# Patient Record
Sex: FEMALE | NOT HISPANIC OR LATINO | Employment: OTHER | URBAN - METROPOLITAN AREA
[De-identification: names, ages, dates, MRNs, and addresses within clinical notes are randomized per-mention and may not be internally consistent; named-entity substitution may affect disease eponyms.]

---

## 2017-01-23 ENCOUNTER — OFFICE VISIT (OUTPATIENT)
Dept: ENDOCRINOLOGY | Facility: CLINIC | Age: 67
End: 2017-01-23

## 2017-01-23 VITALS
WEIGHT: 251 LBS | SYSTOLIC BLOOD PRESSURE: 151 MMHG | DIASTOLIC BLOOD PRESSURE: 96 MMHG | OXYGEN SATURATION: 97 % | BODY MASS INDEX: 39.39 KG/M2 | HEART RATE: 90 BPM | HEIGHT: 67 IN

## 2017-01-23 DIAGNOSIS — E66.01 MORBID OBESITY DUE TO EXCESS CALORIES (H): Primary | ICD-10-CM

## 2017-01-23 ASSESSMENT — ENCOUNTER SYMPTOMS
HEARTBURN: 1
COUGH: 1
VOMITING: 0
RECTAL PAIN: 0
SHORTNESS OF BREATH: 0
BLOATING: 0
DYSPNEA ON EXERTION: 0
SLEEP DISTURBANCES DUE TO BREATHING: 0
SPUTUM PRODUCTION: 0
LEG SWELLING: 1
LOSS OF CONSCIOUSNESS: 0
MUSCLE CRAMPS: 1
ORTHOPNEA: 0
BACK PAIN: 1
STIFFNESS: 0
HYPERTENSION: 0
HOARSE VOICE: 0
BLOOD IN STOOL: 0
TREMORS: 0
WHEEZING: 0
MYALGIAS: 0
COUGH DISTURBING SLEEP: 0
POSTURAL DYSPNEA: 0
SYNCOPE: 0
MUSCLE WEAKNESS: 0
PARALYSIS: 0
CONSTIPATION: 0
JAUNDICE: 0
NECK MASS: 0
TACHYCARDIA: 0
TROUBLE SWALLOWING: 0
EXERCISE INTOLERANCE: 0
ARTHRALGIAS: 0
SEIZURES: 0
TASTE DISTURBANCE: 0
ABDOMINAL PAIN: 0
DISTURBANCES IN COORDINATION: 0
TINGLING: 0
SORE THROAT: 1
RESPIRATORY PAIN: 0
HYPOTENSION: 0
SINUS PAIN: 0
HEMOPTYSIS: 0
DIARRHEA: 0
SINUS CONGESTION: 1
RECTAL BLEEDING: 0
SPEECH CHANGE: 0
NECK PAIN: 0
PALPITATIONS: 0
LEG PAIN: 0
NUMBNESS: 0
SMELL DISTURBANCE: 0
SNORES LOUDLY: 1
BOWEL INCONTINENCE: 0
DIZZINESS: 1
LIGHT-HEADEDNESS: 0
MEMORY LOSS: 1
CLAUDICATION: 0
JOINT SWELLING: 0
HEADACHES: 1
NAUSEA: 0

## 2017-01-23 NOTE — NURSING NOTE
"Chief Complaint   Patient presents with     Weight Problem     [facility]       Filed Vitals:    01/23/17 1102   BP: 151/96   Pulse: 90   Height: 5' 7\"   Weight: 251 lb   SpO2: 97%       Body mass index is 39.3 kg/(m^2).  Emerita Garner CMA                         "

## 2017-01-23 NOTE — PROGRESS NOTES
"    New Medical Weight Management Consult    PATIENT:  Ariadna Duran  MRN:         [id number]  :         1950  BRIANDA:         2017    Dear Dr. Barros,     I had the pleasure of seeing your patient, Ariadna Duran.  Full intake/assessment done to determine barriers to weight loss success and develop a treatment plan.  Ariadna Duran is a 66 year old female interested in treatment of medical problems associated with weight.  Her weight today is 251 lbs 0 oz, Body mass index is 39.3 kg/(m^2)., and she has the following co-morbidities:  I Have Reviewed The Following Co-Morbidities With The Patient 2017   I have the following co-morbidities associated with obesity: Hypertension (High Blood Pressure), Metabolic Syndrome, Back Pain   I have the following co-morbidities associated with obesity: Back Pain       Patient Goals Reviewed With Patient 2017   I am interested in attaining a healthier weight to diminish current health problems related to co-morbid conditions: Yes   I am interested in attaining a healthier weight in order to prevent future health problems: Yes       Referring Provider 2017   Please name the provider who referred you to Medical Weight Management.  If you do not know, please answer: \"I Don't Know\". Dr Barros, DO       Wt Readings from Last 4 Encounters:   17 113.853 kg (251 lb)   16 112.447 kg (247 lb 14.4 oz)   16 109.317 kg (241 lb)   16 105.96 kg (233 lb 9.6 oz)       Weight History Reviewed With Patient 2017   How concerned are you about your weight? Very Concerned   Would you describe your weight gain as gradual? Yes   I became overweight: As a Child   The following factors have contributed to my weight gain:  Lack of Exercise   I have tried the following methods to lose weight: Watching Portions or Calories, Weight Watchers, Atkins-type Diet (Low Carb/High Protein), Slim Fast or Other Liquid Diets, Medications, Weight Loss Surgery   The " most weight I have ever lost was: (lbs) -   My lowest weight since age 19 was: 153   My highest weight since age 19 was: 270   I have the following family history of obesity/being overweight:  -   Has anyone in your family had weight loss surgery? No       Diet Recall Reviewed With Patient 1/23/2017   How many glasses of juice do you drink in a typical day? 0   How many of glasses of milk do you drink in a typical day? 0   How many 8oz glasses of sugar containing drinks such as Tacos-Aid/sweet tea do you drink in a day? 0   How many cans/bottles of sugar pop/soda/tea/sports drinks do you drink in a day? 0   How many cans/bottles of diet pop/soda/tea or sports drink do you drink in a day? 0   How often do you have a drink of alcohol? Monthly or Less   If you do drink, how many drinks might you have in a day? 1 or 2       Eating Habits Reviewed With Patient 1/23/2017   Generally, my meals include foods like these: bread, pasta, rice, potatoes, corn, crackers, sweet dessert, pop, or juice. Almost Everyday   Generally, my meals include foods like these: fried meats, brats, burgers, french fries, pizza, cheese, chips, or ice cream. Half of the Week   Eat fast food (like [employer], [employer], Taco Bell). A Few Times a Week   Eat at a buffet or sit-down restaurant. A Few Times a Week   Eat most of my meals in front of the TV or computer. Everyday   Often skip meals, eat at random times, have no regular eating times. A Few Times a Week   Rarely sit down for a meal but snack or graze throughout.  A Few Times a Week   Eat extra snacks between meals. A Few Times a Week   Eat most of my food at the end of the day. A Few Times a Week   Eat in the middle of the night or wake up at night to eat. Never   Eat extra snacks to prevent or correct low blood sugar. Never   Eat to prevent acid reflux or stomach pain. Half of the Week   Worry about not having enough food to eat. Never   Have you been to the food shelf at least a few times  this year? No   I eat when I am depressed, stressed, anxious, or bored. Half of the Week   I eat when I am happy or as a reward. A Few Times a Week   I feel hungry all the time even if I just have eaten. Never   Feeling full is important to me. Never   Once I start eating, it is hard to stop. -   I finish all the food on my plate even if I am already full. A Few Times a Week   I can't resist eating delicious food or walk past the good food/smell. Never   I eat/snack without noticing that I am eating. A Few Times a Week   I eat when I am preparing the meal. Everyday   I eat more than usual when I see others eating. Half of the Week   I have trouble not eating sweets, ice cream, cookies, or chips if they are around the house. A Few Times a Week   I think about food all day. Almost Everyday   What foods, if any, do you crave? -   Please list any other foods you crave? -   I feel out of control when eating. Almost Everyday   I eat a large amount of food, like a loaf of bread, a box of cookies, a pint/quart of ice cream, all at once. Never   I eat a large amount of food even when I am not hungry. Monthly   I eat rapidly. Weekly   I eat alone because I feel embarrassed and do not want others to see how much I have eaten. Never   I eat until I am uncomfortably full. Weekly   I feel bad, disgusted, or guilty after I overeat. Weekly   I make myself vomit what I have eaten or use laxatives to get rid of food. Never       Activity/Exercise History Reviewed With Patient 1/23/2017   How much of a typical 12 hour day do you spend sitting? Half the Day   How much of a typical 12 hour day do you spend lying down? Less Than Half the Day   How much of a typical day do you spend walking/standing? Half the Day   How many hours (not including work) do you spend on the TV/Video Games/Computer/Tablet/Phone? 6 Hours or More   How many times a week are you active for the purpose of exercise? Never   How many total minutes do you spend doing  some activity for the purpose of exercising when you exercise? 15-30 Minutes   What keeps you from being more active? Other       ROS    PAST MEDICAL HISTORY:  Past Medical History   Diagnosis Date     Hypertension goal BP (blood pressure) < 140/90      Overweight (BMI 25.0-29.9)      Intestinal malabsorption      GERD (gastroesophageal reflux disease) 6/17/2012     Dvt femoral (deep venous thrombosis) (H) 2010     x 2 (one in each leg) years apart      Thyroid nodule      followed by endocrine     Factor 5 Leiden mutation, heterozygous (H)        Work/Social History Reviewed With Patient 1/23/2017   My employment status is: Retired   What is your marital status? /In a Relationship   If in a relationship, is your significant other overweight? Yes   Do you have children? Yes   If you have children, are they overweight? Yes       Mental Health History Reviewed With Patient 1/23/2017   Have you ever been physically or sexually abused? No   How often in the past 2 weeks have you felt little interest or pleasure in doing things? For Several Days   Over the past 2 weeks how often have you felt down, depressed, or hopeless? For Several Days       Sleep History Reviewed With Patient 1/23/2017   How many hours do you sleep at night? 6   Do you think that you snore loudly or has anybody ever heard you snore loudly (louder than talking or so loud it can be heard behind a shut door)? Yes   Has anyone seen or heard you stop breathing during your sleep? No   Do you often feel tired, fatigued, or sleepy during the day? Yes       MEDICATIONS:   Current Outpatient Prescriptions   Medication Sig Dispense Refill     lisinopril (PRINIVIL,ZESTRIL) 10 MG tablet Take 1 tablet (10 mg) by mouth daily 90 tablet 0     triamcinolone (KENALOG) 0.1 % lotion Apply sparingly to affected area three times daily as needed. 60 mL 1     Zinc-Vitamin C  MG LOZG Take 1 tablet by mouth daily       valACYclovir (VALTREX) 1000 mg tablet Take 2  "tablets (2,000 mg) by mouth 2 times daily 12 tablet 0     LYSINE PO        rivaroxaban ANTICOAGULANT (XARELTO) 20 MG TABS tablet Take 1 tablet (20 mg) by mouth daily (with dinner) 90 tablet 1     acyclovir (ZOVIRAX) 5 % cream Apply topically 5 times daily 5 g 3     cyanocolbalamin (VITAMIN  B-12) 1000 MCG tablet Take  by mouth.           ALLERGIES:   Allergies   Allergen Reactions     Nkda [No Known Drug Allergies]        PHYSICAL EXAM:  /96 mmHg  Pulse 90  Ht 1.702 m (5' 7\")  Wt 113.853 kg (251 lb)  BMI 39.30 kg/m2  SpO2 97%  LMP  (LMP Unknown)   A & O x 3  HEENT: NCAT, mucous membranes moist  Respirations unlabored  Location of obesity: Mixed Obesity    ASSESSMENT:  Ariadna is a patient with mature onset morbid obesity and post bariatric weight gain with significant element of familial/genetic influence and with current health consequences. She does need aggressive weight loss plan due to metabolic syndrome.      Ariadna Crunkilton endorses binging, eats a high carb diet, eats a high fat diet, eats fast food once or more per week, uses food as mood management, tends to snack/graze throughout day, rarely sitting to eat a true meal and has a disorganized meal pattern.    Her problem is complicated by strong craving/reward pathways, a binge eating component and poor lifestyle choices    Her ability to lose weight is impacted by lack of confidence.    PLAN:    Dietician visit of education  Volumetrics eating plan  Meal planning  - focus on no between meal snacking, aggressive lowering of starches and cheese    Craving/Reward   Ancillary testing:  N/A.  Food Plan:  Volumetrics and High protein/low carbohydrate.   Activity Plan:  Activity journal.  Supplementary:  N/A.   Medication:  The patient will begin medication in pursuit of improved medical status as influenced by body weight. She will start Qsymia.  There is a mutual understanding of the goals and risks of this therapy. The patient is in agreement. She is " educated on dosage regimen and possible side effects.    RTC:    12 weeks.  30/45 minutes spent on counseling and education    Sincerely,    Yasir Irizarry MD

## 2017-01-23 NOTE — Clinical Note
"2017       RE: Ariadna Duran  [address]  [facility] [address]     Dear Colleague,    Thank you for referring your patient, Ariadna Duran, to the [facility] MEDICAL WEIGHT MANAGEMENT at [facility]. Please see a copy of my visit note below.        New Medical Weight Management Consult    PATIENT:  Ariadna Duran  MRN:         [id number]  :         1950  BRIANDA:         2017    Dear Dr. Barros,     I had the pleasure of seeing your patient, Ariadna Duran.  Full intake/assessment done to determine barriers to weight loss success and develop a treatment plan.  Ariadna Duran is a 66 year old female interested in treatment of medical problems associated with weight.  Her weight today is 251 lbs 0 oz, Body mass index is 39.3 kg/(m^2)., and she has the following co-morbidities:  I Have Reviewed The Following Co-Morbidities With The Patient 2017   I have the following co-morbidities associated with obesity: Hypertension (High Blood Pressure), Metabolic Syndrome, Back Pain   I have the following co-morbidities associated with obesity: Back Pain       Patient Goals Reviewed With Patient 2017   I am interested in attaining a healthier weight to diminish current health problems related to co-morbid conditions: Yes   I am interested in attaining a healthier weight in order to prevent future health problems: Yes       Referring Provider 2017   Please name the provider who referred you to Medical Weight Management.  If you do not know, please answer: \"I Don't Know\". Dr Barros, DO       Wt Readings from Last 4 Encounters:   17 113.853 kg (251 lb)   16 112.447 kg (247 lb 14.4 oz)   16 109.317 kg (241 lb)   16 105.96 kg (233 lb 9.6 oz)       Weight History Reviewed With Patient 2017   How concerned are you about your weight? Very Concerned   Would you describe your weight gain as gradual? Yes   I became overweight: As a " Child   The following factors have contributed to my weight gain:  Lack of Exercise   I have tried the following methods to lose weight: Watching Portions or Calories, Weight Watchers, Atkins-type Diet (Low Carb/High Protein), Slim Fast or Other Liquid Diets, Medications, Weight Loss Surgery   The most weight I have ever lost was: (lbs) -   My lowest weight since age 19 was: 153   My highest weight since age 19 was: 270   I have the following family history of obesity/being overweight:  -   Has anyone in your family had weight loss surgery? No       Diet Recall Reviewed With Patient 1/23/2017   How many glasses of juice do you drink in a typical day? 0   How many of glasses of milk do you drink in a typical day? 0   How many 8oz glasses of sugar containing drinks such as Tacos-Aid/sweet tea do you drink in a day? 0   How many cans/bottles of sugar pop/soda/tea/sports drinks do you drink in a day? 0   How many cans/bottles of diet pop/soda/tea or sports drink do you drink in a day? 0   How often do you have a drink of alcohol? Monthly or Less   If you do drink, how many drinks might you have in a day? 1 or 2       Eating Habits Reviewed With Patient 1/23/2017   Generally, my meals include foods like these: bread, pasta, rice, potatoes, corn, crackers, sweet dessert, pop, or juice. Almost Everyday   Generally, my meals include foods like these: fried meats, brats, burgers, french fries, pizza, cheese, chips, or ice cream. Half of the Week   Eat fast food (like [employer], [employer], Taco Bell). A Few Times a Week   Eat at a buffet or sit-down restaurant. A Few Times a Week   Eat most of my meals in front of the TV or computer. Everyday   Often skip meals, eat at random times, have no regular eating times. A Few Times a Week   Rarely sit down for a meal but snack or graze throughout.  A Few Times a Week   Eat extra snacks between meals. A Few Times a Week   Eat most of my food at the end of the day. A Few Times a Week    Eat in the middle of the night or wake up at night to eat. Never   Eat extra snacks to prevent or correct low blood sugar. Never   Eat to prevent acid reflux or stomach pain. Half of the Week   Worry about not having enough food to eat. Never   Have you been to the food shelf at least a few times this year? No   I eat when I am depressed, stressed, anxious, or bored. Half of the Week   I eat when I am happy or as a reward. A Few Times a Week   I feel hungry all the time even if I just have eaten. Never   Feeling full is important to me. Never   Once I start eating, it is hard to stop. -   I finish all the food on my plate even if I am already full. A Few Times a Week   I can't resist eating delicious food or walk past the good food/smell. Never   I eat/snack without noticing that I am eating. A Few Times a Week   I eat when I am preparing the meal. Everyday   I eat more than usual when I see others eating. Half of the Week   I have trouble not eating sweets, ice cream, cookies, or chips if they are around the house. A Few Times a Week   I think about food all day. Almost Everyday   What foods, if any, do you crave? -   Please list any other foods you crave? -   I feel out of control when eating. Almost Everyday   I eat a large amount of food, like a loaf of bread, a box of cookies, a pint/quart of ice cream, all at once. Never   I eat a large amount of food even when I am not hungry. Monthly   I eat rapidly. Weekly   I eat alone because I feel embarrassed and do not want others to see how much I have eaten. Never   I eat until I am uncomfortably full. Weekly   I feel bad, disgusted, or guilty after I overeat. Weekly   I make myself vomit what I have eaten or use laxatives to get rid of food. Never       Activity/Exercise History Reviewed With Patient 1/23/2017   How much of a typical 12 hour day do you spend sitting? Half the Day   How much of a typical 12 hour day do you spend lying down? Less Than Half the Day    How much of a typical day do you spend walking/standing? Half the Day   How many hours (not including work) do you spend on the TV/Video Games/Computer/Tablet/Phone? 6 Hours or More   How many times a week are you active for the purpose of exercise? Never   How many total minutes do you spend doing some activity for the purpose of exercising when you exercise? 15-30 Minutes   What keeps you from being more active? Other       ROS    PAST MEDICAL HISTORY:  Past Medical History   Diagnosis Date     Hypertension goal BP (blood pressure) < 140/90      Overweight (BMI 25.0-29.9)      Intestinal malabsorption      GERD (gastroesophageal reflux disease) 6/17/2012     Dvt femoral (deep venous thrombosis) (H) 2010     x 2 (one in each leg) years apart      Thyroid nodule      followed by endocrine     Factor 5 Leiden mutation, heterozygous (H)        Work/Social History Reviewed With Patient 1/23/2017   My employment status is: Retired   What is your marital status? /In a Relationship   If in a relationship, is your significant other overweight? Yes   Do you have children? Yes   If you have children, are they overweight? Yes       Mental Health History Reviewed With Patient 1/23/2017   Have you ever been physically or sexually abused? No   How often in the past 2 weeks have you felt little interest or pleasure in doing things? For Several Days   Over the past 2 weeks how often have you felt down, depressed, or hopeless? For Several Days       Sleep History Reviewed With Patient 1/23/2017   How many hours do you sleep at night? 6   Do you think that you snore loudly or has anybody ever heard you snore loudly (louder than talking or so loud it can be heard behind a shut door)? Yes   Has anyone seen or heard you stop breathing during your sleep? No   Do you often feel tired, fatigued, or sleepy during the day? Yes       MEDICATIONS:   Current Outpatient Prescriptions   Medication Sig Dispense Refill     lisinopril  "(PRINIVIL,ZESTRIL) 10 MG tablet Take 1 tablet (10 mg) by mouth daily 90 tablet 0     triamcinolone (KENALOG) 0.1 % lotion Apply sparingly to affected area three times daily as needed. 60 mL 1     Zinc-Vitamin C  MG LOZG Take 1 tablet by mouth daily       valACYclovir (VALTREX) 1000 mg tablet Take 2 tablets (2,000 mg) by mouth 2 times daily 12 tablet 0     LYSINE PO        rivaroxaban ANTICOAGULANT (XARELTO) 20 MG TABS tablet Take 1 tablet (20 mg) by mouth daily (with dinner) 90 tablet 1     acyclovir (ZOVIRAX) 5 % cream Apply topically 5 times daily 5 g 3     cyanocolbalamin (VITAMIN  B-12) 1000 MCG tablet Take  by mouth.           ALLERGIES:   Allergies   Allergen Reactions     Nkda [No Known Drug Allergies]        PHYSICAL EXAM:  /96 mmHg  Pulse 90  Ht 1.702 m (5' 7\")  Wt 113.853 kg (251 lb)  BMI 39.30 kg/m2  SpO2 97%  LMP  (LMP Unknown)   A & O x 3  HEENT: NCAT, mucous membranes moist  Respirations unlabored  Location of obesity: Mixed Obesity    ASSESSMENT:  Ariadna is a patient with mature onset morbid obesity and post bariatric weight gain with significant element of familial/genetic influence and with current health consequences. She does need aggressive weight loss plan due to metabolic syndrome.      Ariadna Morenoilton endorses binging, eats a high carb diet, eats a high fat diet, eats fast food once or more per week, uses food as mood management, tends to snack/graze throughout day, rarely sitting to eat a true meal and has a disorganized meal pattern.    Her problem is complicated by strong craving/reward pathways, a binge eating component and poor lifestyle choices    Her ability to lose weight is impacted by lack of confidence.    PLAN:    Dietician visit of education  Volumetrics eating plan  Meal planning  - focus on no between meal snacking, aggressive lowering of starches and cheese    Craving/Reward   Ancillary testing:  N/A.  Food Plan:  Volumetrics and High protein/low carbohydrate. "   Activity Plan:  Activity journal.  Supplementary:  N/A.   Medication:  The patient will begin medication in pursuit of improved medical status as influenced by body weight. She will start Qsymia.  There is a mutual understanding of the goals and risks of this therapy. The patient is in agreement. She is educated on dosage regimen and possible side effects.    RTC:    12 weeks.  30/45 minutes spent on counseling and education    Sincerely,    Yasir Irizarry MD

## 2017-01-25 ENCOUNTER — CARE COORDINATION (OUTPATIENT)
Dept: ENDOCRINOLOGY | Facility: CLINIC | Age: 67
End: 2017-01-25

## 2017-01-25 DIAGNOSIS — E66.01 MORBID OBESITY DUE TO EXCESS CALORIES (H): ICD-10-CM

## 2017-01-25 DIAGNOSIS — E66.09 NON MORBID OBESITY DUE TO EXCESS CALORIES: Primary | ICD-10-CM

## 2017-01-30 DIAGNOSIS — I10 ESSENTIAL HYPERTENSION WITH GOAL BLOOD PRESSURE LESS THAN 140/90: Primary | Chronic | ICD-10-CM

## 2017-01-30 NOTE — TELEPHONE ENCOUNTER
Pending Prescriptions:                       Disp   Refills    lisinopril (PRINIVIL/ZESTRIL) 10 MG ccasmv02 tab*0            Sig: Take 1 tablet (10 mg) by mouth daily          Last Written Prescription Date: 9/13/16  Last Fill Quantity: 90, # refills: 0  Last Office Visit with G, P or [facility] prescribing provider: 11/7/16       POTASSIUM   Date Value Ref Range Status   09/13/2016 4.3 3.4 - 5.3 mmol/L Final     CREATININE   Date Value Ref Range Status   09/13/2016 0.72 0.52 - 1.04 mg/dL Final     BP Readings from Last 3 Encounters:   01/23/17 151/96   11/07/16 149/83   09/13/16 135/77

## 2017-01-31 RX ORDER — LISINOPRIL 10 MG/1
10 TABLET ORAL DAILY
Qty: 90 TABLET | Refills: 1 | Status: SHIPPED | OUTPATIENT
Start: 2017-01-31 | End: 2017-10-13

## 2017-01-31 NOTE — TELEPHONE ENCOUNTER
Routing refill request to provider for review/approval because:  BP out of range    PCP: Medication pended.    Patient has no follow up scheduled, when would you like to see her again    Thank you    Chanel Yang RN

## 2017-01-31 NOTE — TELEPHONE ENCOUNTER
Request that she returns within the next 1-2 weeks for a BP check for nurse only visit.   Thanks  Apolonia Nova NP

## 2017-02-03 ENCOUNTER — ALLIED HEALTH/NURSE VISIT (OUTPATIENT)
Dept: SURGERY | Facility: CLINIC | Age: 67
End: 2017-02-03

## 2017-02-03 DIAGNOSIS — I10 HYPERTENSION GOAL BP (BLOOD PRESSURE) < 140/90: Primary | Chronic | ICD-10-CM

## 2017-02-03 RX ORDER — PHENTERMINE HYDROCHLORIDE 15 MG/1
15 CAPSULE ORAL EVERY MORNING
Qty: 30 CAPSULE | Refills: 2 | Status: SHIPPED | OUTPATIENT
Start: 2017-02-03 | End: 2017-12-01

## 2017-02-03 RX ORDER — TOPIRAMATE 25 MG/1
TABLET, FILM COATED ORAL
Qty: 60 TABLET | Refills: 2 | Status: SHIPPED | OUTPATIENT
Start: 2017-02-03 | End: 2017-12-01

## 2017-02-03 RX ORDER — LISINOPRIL 10 MG/1
TABLET ORAL
Qty: 90 TABLET | Refills: 0 | OUTPATIENT
Start: 2017-02-03

## 2017-02-03 NOTE — PATIENT INSTRUCTIONS
MEDICATION STARTED AT THIS APPOINTMENT    We are starting Phentermine. Take one tablet in the morning.  Call the nurse at [phone] if you have any questions or concerns. (Do not stop taking it if you don't think it's working. For some people it works without them knowing it.)    Phentermine is being prescribed because you identified hunger as one of the main causes for your extra weight.      Our patients on Phentermine find that they:    >feel less hunger    >find it easier to push the plate away   >have an easier time eating less    For some of our patients, these feelings are very real and immediate. For other patients, the feelings are less obvious. They don't feel much of a change but find they've lost weight. Like all weight loss medications, Phentermine  works best when you help it work. This means:  1. Having less tempting high calorie (fattening) food around the house or office. (For people with strong cravings this is very important.)   2. Staying away from situations or people that may trigger your cravings .   3. Eating out only one time or less each week.  4. Eating your meals at a table with the TV or computer off.    Side-effects. Phentermine is generally well tolerated. The main side-effects we see are feelings of racing pulse or rapid heart beat. Some people can get an elevated blood pressure. Because of this we may have you come back within a week or so of starting the medication for a blood pressure check.         In order to get refills of this or any medication we prescribe you must be seen in the medical weight mgmt clinic every 2-3 months. Please have your pharmacy fax a refill request to [phone].      MEDICATION STARTED AT THIS APPOINTMENT  We are starting topiramate at bedtime.  Start one tab, 25 mg, for a week. Go up to 50 mg (2 tabs) for the next week. At the third week, take   3 tabs (75 mg).  Stay at 3 tabs until you are seen again. Call the nurse at [phone] if you have  any questions or concerns. (Do not stop taking it if you don't think it's working. For some people it works even though they do not feel much different.)    Topiramate (Topamax) is a medication that is used most often to treat migraine headaches or for seizures. It has also been found to help with weight loss. Although it's not currently FDA approved for weight loss, it has been used safely for a number of years to help people who are carrying extra weight.     Just how topiramate helps with weight loss has not been exactly determined. However it seems to work on areas of the brain to quiet down signals related to eating.      Topiramate may make you:    >feel less interest in eating in between meals   >think less about food and eating   >find it easier to push the plate away   >find giving up pop easier    >have an easier time eating less    For some of our patients, the pills work right away. They feel and think quite differently about food. Other patients don't feel much of a change but find in fact they have lost weight! Like all weight loss medications, topiramate works best when you help it work.  This means:    1) Have less tempting high calorie (fattening) food around the house or office    2) Have lower calorie food (fruits, vegetables,low fat meats and dairy) for snacks    3) Eat out only one time or less each week.   4) Eat your meals at a table with the TV or computer off.    Side-effects. Topiramate is generally well tolerated. The main side-effects we see are:   Tingling in hands,feet, or face (usually not very troublesome)   Mental confusion and word finding trouble (about 10% of patients have this.)     Feeling sleepy or a bit dopey- this goes away very soon after starting.    One of the dangers of topiramate is the possibility of birth defects--if you get pregnant when you are on it, there is the risk that your baby will be born with a cleft lip or palate.  If you are on topiramate and of child  bearing age, you need to be on a reliable form of birth control or refrain from sexual intercourse.     Please refer to the pharmacy insert for more information on side-effects. Since many pharmacists are not familiar with the use of topiramate in weight loss, calling the clinic will get you the most accurate information on the use of this medication for weight loss.     In order to get refills of this or any medication we prescribe you must be seen in the medical weight mgmt clinic every 2-3 months. Please have your pharmacy fax a refill request to [phone].

## 2017-02-03 NOTE — PROGRESS NOTES
Qsymia PA denied.  Order for Phentermine 15 mg and Topiramate 25 --> 50 mg sent to pharmacy per [facility] nurse protocol.  Spoke to patient. Reviewed new medication, directions and dosage.   Patient verbalized understanding and agrees with plan.  Reshma Ahn RN, BSN

## 2017-02-03 NOTE — PROGRESS NOTES
"Ariadna Morenoilton is a 66 year-old female presents today for weight management nutrition consultation.  Pt was referred by Dr. Irizarry.    *Pt signed Medicare ABN form in agreement with receiving MNT, which is good from 2/3/17-2/3/18.     Anthropometrics:    Height as of 1/23/17: 1.702 m (5' 7\").    Weight as of 1/23/17: 113.853 kg (251 lb) with BMI of 39.30.    Nutrition history  See MD note for details.  Pt seems to have a good basic understanding of nutrition; however, in selecting healthy foods to eat, she does so with higher caloric foods (nuts/nut butters, whole/spouted grains, granola, etc).    Note: Pt had a RNY GB in 2006 per Pt report. She does take MVI daily, B12, but no Ca due to elevated blood levels and parathyroid issue being managed by endocrinologist per Pt report.    Nutrition Prescription  Low-carb, Higher Protein Volumetric-type diet (per MD)    Nutrition Diagnosis  Obesity related to history of excessive energy intake as evidenced by BMI > 30.     Nutrition Intervention  Materials/education provided on volumetric-type, low-carb, high-protein diet with portion control and healthy food choices (Plate Method), Mindful eating, meal and snack planning and websites, sample meal plans    Patient Understanding: good  Expected Compliance: good    Nutrition Goals  1) Focus on lean protein and non-starchy vegetables/whole fruit at each meal, 3 times/day (no snacks per Dr. Irizarry).   2) Record food intake prior to eating throughout the day (food and amount).   3) Start exercise (treadmill and hand weights).   4) Rid home of trigger \"entertainment\" foods.  5) Follow Dr. Irizarry's recommendation of avoiding high fat (peanut butter, cheese) and high carb (breads, pastas, rice, granola) type foods.     Follow-Up: PRN    Time spent with patient: 15 minutes.  Lydia Mai, MILENA, LD, CLT  "

## 2017-02-03 NOTE — TELEPHONE ENCOUNTER
Lisinopril      Last Written Prescription Date: 01/31/17  Last Fill Quantity: 90, # refills: 1  Last Office Visit with [facility], [facility] or [facility] prescribing provider: 11/07/16       POTASSIUM   Date Value Ref Range Status   09/13/2016 4.3 3.4 - 5.3 mmol/L Final     CREATININE   Date Value Ref Range Status   09/13/2016 0.72 0.52 - 1.04 mg/dL Final     BP Readings from Last 3 Encounters:   01/23/17 151/96   11/07/16 149/83   09/13/16 135/77     Geno Low MA

## 2017-02-03 NOTE — TELEPHONE ENCOUNTER
Sent denial to pharmacy  This was sent to same requesting pharmacy on 1/31/17 with refills remaining    Michelle Matthew RN

## 2017-02-03 NOTE — PATIENT INSTRUCTIONS
"Nutrition Goals  1) Focus on lean protein and non-starchy vegetables/whole fruit at each meal, 3 times/day (no snacks per Dr. Irizarry).   2) Record food intake prior to eating throughout the day (food and amount).   3) Start exercise (treadmill and hand weights).   4) Rid home of trigger \"entertainment\" foods.  5) Follow Dr. Irizarry's recommendation of avoiding high fat (peanut butter, cheese) and high carb (breads, pastas, rice, granola) type foods.       "

## 2017-05-26 DIAGNOSIS — I10 ESSENTIAL HYPERTENSION WITH GOAL BLOOD PRESSURE LESS THAN 140/90: Chronic | ICD-10-CM

## 2017-05-30 RX ORDER — LISINOPRIL 10 MG/1
TABLET ORAL
Refills: 0
Start: 2017-05-30

## 2017-05-30 NOTE — TELEPHONE ENCOUNTER
Disp Refills Start End SONIA   lisinopril (PRINIVIL/ZESTRIL) 10 MG tablet 90 tablet 1 1/31/2017  No   Sig: Take 1 tablet (10 mg) by mouth daily          Last Written Prescription Date: 01/31/2017  (Odd timing, requesting early?)   Last Fill Quantity: 90, # refills: 1  Last Office Visit with [facility] [place] or [facility] prescribing provider: 11/2016       Potassium   Date Value Ref Range Status   09/13/2016 4.3 3.4 - 5.3 mmol/L Final     Creatinine   Date Value Ref Range Status   09/13/2016 0.72 0.52 - 1.04 mg/dL Final     BP Readings from Last 3 Encounters:   01/23/17 (!) 151/96   11/07/16 149/83   09/13/16 135/77

## 2017-06-05 DIAGNOSIS — I10 ESSENTIAL HYPERTENSION WITH GOAL BLOOD PRESSURE LESS THAN 140/90: Chronic | ICD-10-CM

## 2017-06-05 RX ORDER — LISINOPRIL 10 MG/1
TABLET ORAL
Qty: 90 TABLET | Refills: 0 | OUTPATIENT
Start: 2017-06-05

## 2017-09-08 DIAGNOSIS — I10 ESSENTIAL HYPERTENSION WITH GOAL BLOOD PRESSURE LESS THAN 140/90: Chronic | ICD-10-CM

## 2017-09-08 NOTE — TELEPHONE ENCOUNTER
lisinopril (PRINIVIL/ZESTRIL) 10 MG tablet 90 tablet 1 1/31/2017         Left a message to pt return our call, needs to schedule an appointment.      Last Written Prescription Date: 01/31/2017  Last Fill Quantity: 90, # refills: 1  Last Office Visit with [facility], [facility] or [facility] prescribing provider: 11/07/2016       Potassium   Date Value Ref Range Status   09/13/2016 4.3 3.4 - 5.3 mmol/L Final     Creatinine   Date Value Ref Range Status   09/13/2016 0.72 0.52 - 1.04 mg/dL Final     BP Readings from Last 3 Encounters:   01/23/17 (!) 151/96   11/07/16 149/83   09/13/16 135/77

## 2017-09-14 RX ORDER — LISINOPRIL 10 MG/1
TABLET ORAL
Qty: 30 TABLET | Refills: 0 | Status: SHIPPED | OUTPATIENT
Start: 2017-09-14 | End: 2017-10-12

## 2017-10-12 DIAGNOSIS — I10 ESSENTIAL HYPERTENSION WITH GOAL BLOOD PRESSURE LESS THAN 140/90: Chronic | ICD-10-CM

## 2017-10-13 RX ORDER — LISINOPRIL 10 MG/1
TABLET ORAL
Qty: 30 TABLET | Refills: 0 | OUTPATIENT
Start: 2017-10-13

## 2017-10-13 RX ORDER — LISINOPRIL 10 MG/1
TABLET ORAL
Qty: 60 TABLET | Refills: 0 | Status: SHIPPED | OUTPATIENT
Start: 2017-10-13 | End: 2017-12-01

## 2017-10-13 NOTE — TELEPHONE ENCOUNTER
Routing refill request to provider for review/approval because:  Last BP elevated.     Erlinda ISLAS RN

## 2017-11-29 NOTE — PROGRESS NOTES
SUBJECTIVE:   Ariadna Duran is a 67 year old female who presents for Preventive Visit.  Are you in the first 12 months of your Medicare Part B coverage?  No    Healthy Habits:    Do you get at least three servings of calcium containing foods daily (dairy, green leafy vegetables, etc.)? yes and no, taking calcium and/or vitamin D supplement: yes - vitamin D, not calcium    Amount of exercise or daily activities, outside of work: 7 day(s) per week in warmer weather (3 little dogs); treadmill at home but doesn't use and could join a gym     Problems taking medications regularly No    Medication side effects: No    Have you had an eye exam in the past two years? yes    Do you see a dentist twice per year? yes    Do you have sleep apnea, excessive snoring or daytime drowsiness? yes - snoring    COGNITIVE SCREEN  1) Repeat 3 items (Banana, Sunrise, Chair)    2) Clock draw: NORMAL  3) 3 item recall: Recalls 3 objects  Results: 3 items recalled: COGNITIVE IMPAIRMENT LESS LIKELY    Mini-CogTM Copyright [employer]. Licensed by the author for use in [facility] [employer]; reprinted with permission (zeinab@[facility]). All rights reserved.      Saw Dr. Maciel and tried Topamax and Phentermine and didn't like how she felt on them  Dietician reinforced what she knew already she said so she personally didn't find it too helpful  Weight struggles since 2nd grade and h/o weight loss surgeries and has tried many different things over the years  Eats out a lot b/c that is 's choice  Could consider [facility]  Valtrex not needed - uses lysine prn - past used acyclovir cream which was helpful for her cold sores  Past DEXA was good   Is taking Vit D and B complex  Normal pap in 2010 and 2014; was through [facility]  Fam Hx        Social History   Substance Use Topics     Smoking status: Former Smoker     Packs/day: 0.50     Years: 3.00     Types: Cigarettes     Quit date: 1/1/1972     Smokeless tobacco: Never Used       Comment:  Pt quit smoking 1972 (5 years)     Alcohol use 0.0 oz/week      Comment: 2-3 x week       The patient does not drink >3 drinks per day nor >7 drinks per week.     Today's PHQ-2 Score:   PHQ-2 ( 1999 Pfizer) 12/1/2017 11/7/2016   Q1: Little interest or pleasure in doing things 0 0   Q2: Feeling down, depressed or hopeless 0 0   PHQ-2 Score 0 0         Do you feel safe in your environment - Yes    Do you have a Health Care Directive?: Yes: Patient states has Advance Directive and will bring in a copy to clinic.    Current providers sharing in care for this patient include: Patient Care Team:  Jackelyn Barros DO as PCP - General (Internal Medicine)  Martha Balbuena MD as MD (INTERNAL MEDICINE - ENDOCRINOLOGY, DIABETES & METABOLISM)      Hearing impairment: Yes, Difficulty following a conversation in a noisy restaurant or crowded room.    Feel that people are mumbling or not speaking clearly.    Difficulty following dialogue in the theater.    Ability to successfully perform activities of daily living: Yes, no assistance needed     Fall risk:  Fallen 2 or more times in the past year?: No  Any fall with injury in the past year?: No      Home safety:  throw rugs in the hallway      The following health maintenance items are reviewed in Epic and correct as of today:  Health Maintenance   Topic Date Due     DEXA SCAN SCREENING (SYSTEM ASSIGNED)  08/24/2016     PNEUMOCOCCAL (1 of 2 - PCV13) 08/24/2016     ADVANCE DIRECTIVE PLANNING Q5 YRS  03/23/2017     MAMMO SCREEN Q2 YR (SYSTEM ASSIGNED)  11/07/2018     BMP Q1 YR  12/01/2018     FALL RISK ASSESSMENT  12/01/2018     LIPID SCREEN Q5 YR FEMALE (SYSTEM ASSIGNED)  06/13/2021     TETANUS IMMUNIZATION (SYSTEM ASSIGNED)  06/17/2024     COLON CANCER SCREEN (SYSTEM ASSIGNED)  07/28/2024     INFLUENZA VACCINE (SYSTEM ASSIGNED)  Addressed     HEPATITIS C SCREENING  Completed       ROS:  Comprehensive ROS negative unless as stated above in HPI.  "    OBJECTIVE:   /82 (BP Location: Right arm, Cuff Size: Adult Large)  Pulse 73  Temp 97.8  F (36.6  C) (Tympanic)  Ht 5' 7\" (1.702 m)  Wt 260 lb (117.9 kg)  LMP  (LMP Unknown)  SpO2 96%  Breastfeeding? No  BMI 40.72 kg/m2 Estimated body mass index is 40.72 kg/(m^2) as calculated from the following:    Height as of this encounter: 5' 7\" (1.702 m).    Weight as of this encounter: 260 lb (117.9 kg).  EXAM:   GENERAL: alert, no distress and obese  EYES: Eyes grossly normal to inspection, PERRL and conjunctivae and sclerae normal  HENT: ear canals and TM's normal, nose and mouth without ulcers or lesions  NECK: no adenopathy, no asymmetry, masses, or scars and thyroid normal to palpation  RESP: lungs clear to auscultation - no rales, rhonchi or wheezes  BREAST: normal without masses, tenderness or nipple discharge and no palpable axillary masses or adenopathy  CV: regular rate and rhythm, normal S1 S2, no S3 or S4, no murmur, click or rub, no peripheral edema and no carotid bruits  ABDOMEN: obese, soft, nontender, no hepatosplenomegaly, no masses and bowel sounds normal; limited by obesity  MS: no gross musculoskeletal defects noted, no edema  SKIN: no suspicious lesions or rashes  NEURO: Normal strength and tone, mentation intact and speech normal  PSYCH: mentation appears normal, affect normal/bright    ASSESSMENT / PLAN:   1. Encounter for preventative adult health care exam with abnormal findings  Non-fasting today  She will try to find records of her past pap smears - Normal pap in 2010 and 2014; was through [facility] - would like to find one prior to that  If three normal paps in the last 10 yrs can consider her finished with pap smears  She says a remote DEXA was normal; obesity is a weight bearing factor  She declines flu and pneumococcal vaccines    2. Morbid obesity (H)  Discussed today at length past and present struggles with weight over her lifetime - see HPI for details    3. Essential " "hypertension with goal blood pressure less than 140/90  At goal  - lisinopril (PRINIVIL/ZESTRIL) 10 MG tablet; Take 1 tablet (10 mg) by mouth daily  Dispense: 90 tablet; Refill: 3  - CBC with platelets  - Comprehensive metabolic panel    4. Factor 5 Leiden mutation, heterozygous (H)  Follows with DR. Sharif; past h/o DVT and is on chronic Xarelto    5. Vitamin D deficiency  - Vitamin D Deficiency    6. Recurrent cold sores  Also uses Lysine PRN  - acyclovir (ZOVIRAX) 5 % cream; Apply topically 5 times daily As needed for cold sore outbreaks  Dispense: 5 g; Refill: 1    End of Life Planning:  Patient currently has an advanced directive: Yes: Patient states has Advance Directive and will bring in a copy to clinic.    COUNSELING:  Reviewed preventive health counseling, as reflected in patient instructions       Regular exercise       Healthy diet/nutrition  Estimated body mass index is 40.72 kg/(m^2) as calculated from the following:    Height as of this encounter: 5' 7\" (1.702 m).    Weight as of this encounter: 260 lb (117.9 kg).  Weight management plan: Discussed healthy diet and exercise guidelines and patient will follow up in 12 months in clinic to re-evaluate.   reports that she quit smoking about 45 years ago. Her smoking use included Cigarettes. She has a 1.50 pack-year smoking history. She has never used smokeless tobacco.        Appropriate preventive services were discussed with this patient, including applicable screening as appropriate for cardiovascular disease, diabetes, osteopenia/osteoporosis, and glaucoma.  As appropriate for age/gender, discussed screening for colorectal cancer, prostate cancer, breast cancer, and cervical cancer. Checklist reviewing preventive services available has been given to the patient.    Reviewed patients plan of care and provided an AVS. The Intermediate Care Plan ( asthma action plan, low back pain action plan, and migraine action plan) for Ariadna meets the Care Plan " requirement. This Care Plan has been established and reviewed with the Patient.    Patient Instructions   Labs today  [phone] in [address] downstairs  Try to find the most recent pap prior to 2010 to verify that you have had a total of 3 normal pap smears in the past 10 years  Feel free to reach out to me if you want to discuss weight loss in more detail    Jackelyn Barros, DO  [facility]

## 2017-11-29 NOTE — PATIENT INSTRUCTIONS
Labs today  [phone] in [address] downstairs  Try to find the most recent pap prior to 2010 to verify that you have had a total of 3 normal pap smears in the past 10 years  Feel free to reach out to me if you want to discuss weight loss in more detail    Preventive Health Recommendations  Female Ages 65 +    Yearly exam:     See your health care provider every year in order to  o Review health changes.   o Discuss preventive care.    o Review your medicines if your doctor has prescribed any.      You no longer need a yearly Pap test unless you've had an abnormal Pap test in the past 10 years. If you have vaginal symptoms, such as bleeding or discharge, be sure to talk with your provider about a Pap test.      Every 1 to 2 years, have a mammogram.  If you are over 69, talk with your health care provider about whether or not you want to continue having screening mammograms.      Every 10 years, have a colonoscopy. Or, have a yearly FIT test (stool test). These exams will check for colon cancer.       Have a cholesterol test every 5 years, or more often if your doctor advises it.       Have a diabetes test (fasting glucose) every three years. If you are at risk for diabetes, you should have this test more often.       At age 65, have a bone density scan (DEXA) to check for osteoporosis (brittle bone disease).    Shots:    Get a flu shot each year.    Get a tetanus shot every 10 years.    Talk to your doctor about your pneumonia vaccines. There are now two you should receive - Pneumovax (PPSV 23) and Prevnar (PCV 13).    Talk to your doctor about the shingles vaccine.    Talk to your doctor about the hepatitis B vaccine.    Nutrition:     Eat at least 5 servings of fruits and vegetables each day.      Eat whole-grain bread, whole-wheat pasta and brown rice instead of white grains and rice.      Talk to your provider about Calcium and Vitamin D.     Lifestyle    Exercise at least  150 minutes a week (30 minutes a day, 5 days a week). This will help you control your weight and prevent disease.      Limit alcohol to one drink per day.      No smoking.       Wear sunscreen to prevent skin cancer.       See your dentist twice a year for an exam and cleaning.      See your eye doctor every 1 to 2 years to screen for conditions such as glaucoma, macular degeneration and cataracts.

## 2017-12-01 ENCOUNTER — OFFICE VISIT (OUTPATIENT)
Dept: FAMILY MEDICINE | Facility: CLINIC | Age: 67
End: 2017-12-01
Payer: COMMERCIAL

## 2017-12-01 VITALS
HEIGHT: 67 IN | SYSTOLIC BLOOD PRESSURE: 136 MMHG | WEIGHT: 260 LBS | HEART RATE: 73 BPM | BODY MASS INDEX: 40.81 KG/M2 | OXYGEN SATURATION: 96 % | DIASTOLIC BLOOD PRESSURE: 82 MMHG | TEMPERATURE: 97.8 F

## 2017-12-01 DIAGNOSIS — Z00.01 ENCOUNTER FOR PREVENTATIVE ADULT HEALTH CARE EXAM WITH ABNORMAL FINDINGS: Primary | ICD-10-CM

## 2017-12-01 DIAGNOSIS — E55.9 VITAMIN D DEFICIENCY: Chronic | ICD-10-CM

## 2017-12-01 DIAGNOSIS — D68.51 FACTOR 5 LEIDEN MUTATION, HETEROZYGOUS (H): Chronic | ICD-10-CM

## 2017-12-01 DIAGNOSIS — I10 ESSENTIAL HYPERTENSION WITH GOAL BLOOD PRESSURE LESS THAN 140/90: Chronic | ICD-10-CM

## 2017-12-01 DIAGNOSIS — B00.1 RECURRENT COLD SORES: Chronic | ICD-10-CM

## 2017-12-01 DIAGNOSIS — E66.01 MORBID OBESITY (H): ICD-10-CM

## 2017-12-01 DIAGNOSIS — Z12.31 VISIT FOR SCREENING MAMMOGRAM: ICD-10-CM

## 2017-12-01 LAB
ERYTHROCYTE [DISTWIDTH] IN BLOOD BY AUTOMATED COUNT: 13.7 % (ref 10–15)
HCT VFR BLD AUTO: 38.5 % (ref 35–47)
HGB BLD-MCNC: 12.4 G/DL (ref 11.7–15.7)
MCH RBC QN AUTO: 32.6 PG (ref 26.5–33)
MCHC RBC AUTO-ENTMCNC: 32.2 G/DL (ref 31.5–36.5)
MCV RBC AUTO: 101 FL (ref 78–100)
PLATELET # BLD AUTO: 278 10E9/L (ref 150–450)
RBC # BLD AUTO: 3.8 10E12/L (ref 3.8–5.2)
WBC # BLD AUTO: 5.8 10E9/L (ref 4–11)

## 2017-12-01 PROCEDURE — 82306 VITAMIN D 25 HYDROXY: CPT | Performed by: INTERNAL MEDICINE

## 2017-12-01 PROCEDURE — 36415 COLL VENOUS BLD VENIPUNCTURE: CPT | Performed by: INTERNAL MEDICINE

## 2017-12-01 PROCEDURE — 80053 COMPREHEN METABOLIC PANEL: CPT | Performed by: INTERNAL MEDICINE

## 2017-12-01 PROCEDURE — G0439 PPPS, SUBSEQ VISIT: HCPCS | Performed by: INTERNAL MEDICINE

## 2017-12-01 PROCEDURE — 85027 COMPLETE CBC AUTOMATED: CPT | Performed by: INTERNAL MEDICINE

## 2017-12-01 RX ORDER — VALACYCLOVIR HYDROCHLORIDE 1 G/1
2000 TABLET, FILM COATED ORAL 2 TIMES DAILY
Qty: 12 TABLET | Refills: 0 | Status: CANCELLED | OUTPATIENT
Start: 2017-12-01

## 2017-12-01 RX ORDER — ACYCLOVIR 50 MG/G
CREAM TOPICAL
Qty: 5 G | Refills: 1 | Status: SHIPPED | OUTPATIENT
Start: 2017-12-01 | End: 2018-12-04

## 2017-12-01 RX ORDER — LISINOPRIL 10 MG/1
10 TABLET ORAL DAILY
Qty: 90 TABLET | Refills: 3 | Status: SHIPPED | OUTPATIENT
Start: 2017-12-01 | End: 2018-11-06

## 2017-12-01 NOTE — MR AVS SNAPSHOT
After Visit Summary   12/1/2017    Ariadna Duran    MRN: [id number]           Patient Information     Date Of Birth          1950        Visit Information        Provider Department      12/1/2017 11:30 AM Jackelyn Barros,  [facility]        Today's Diagnoses     Encounter for preventative adult health care exam with abnormal findings    -  1    Essential hypertension with goal blood pressure less than 140/90        Vitamin D deficiency        Recurrent cold sores          Care Instructions    Labs today  [phone] in [address] downstairs  Try to find the most recent pap prior to 2010 to verify that you have had a total of 3 normal pap smears in the past 10 years  Feel free to reach out to me if you want to discuss weight loss in more detail    Preventive Health Recommendations  Female Ages 65 +    Yearly exam:     See your health care provider every year in order to  o Review health changes.   o Discuss preventive care.    o Review your medicines if your doctor has prescribed any.      You no longer need a yearly Pap test unless you've had an abnormal Pap test in the past 10 years. If you have vaginal symptoms, such as bleeding or discharge, be sure to talk with your provider about a Pap test.      Every 1 to 2 years, have a mammogram.  If you are over 69, talk with your health care provider about whether or not you want to continue having screening mammograms.      Every 10 years, have a colonoscopy. Or, have a yearly FIT test (stool test). These exams will check for colon cancer.       Have a cholesterol test every 5 years, or more often if your doctor advises it.       Have a diabetes test (fasting glucose) every three years. If you are at risk for diabetes, you should have this test more often.       At age 65, have a bone density scan (DEXA) to check for osteoporosis (brittle bone disease).    Shots:    Get a flu shot each year.    Get  a tetanus shot every 10 years.    Talk to your doctor about your pneumonia vaccines. There are now two you should receive - Pneumovax (PPSV 23) and Prevnar (PCV 13).    Talk to your doctor about the shingles vaccine.    Talk to your doctor about the hepatitis B vaccine.    Nutrition:     Eat at least 5 servings of fruits and vegetables each day.      Eat whole-grain bread, whole-wheat pasta and brown rice instead of white grains and rice.      Talk to your provider about Calcium and Vitamin D.     Lifestyle    Exercise at least 150 minutes a week (30 minutes a day, 5 days a week). This will help you control your weight and prevent disease.      Limit alcohol to one drink per day.      No smoking.       Wear sunscreen to prevent skin cancer.       See your dentist twice a year for an exam and cleaning.      See your eye doctor every 1 to 2 years to screen for conditions such as glaucoma, macular degeneration and cataracts.          Follow-ups after your visit        Who to contact     If you have questions or need follow up information about today's clinic visit or your schedule please contact [facility] directly at [phone].  Normal or non-critical lab and imaging results will be communicated to you by HardMetricshart, letter or phone within 4 business days after the clinic has received the results. If you do not hear from us within 7 days, please contact the clinic through Lovlit or phone. If you have a critical or abnormal lab result, we will notify you by phone as soon as possible.  Submit refill requests through The Kernel or call your pharmacy and they will forward the refill request to us. Please allow 3 business days for your refill to be completed.          Additional Information About Your Visit        The Kernel Information     The Kernel gives you secure access to your electronic health record. If you see a primary care provider, you can also send messages to your care team and make appointments. If you  "have questions, please call your primary care clinic.  If you do not have a primary care provider, please call [phone] and they will assist you.        Care EveryWhere ID     This is your Care EveryWhere ID. This could be used by other organizations to access your [facility] medical records  [id number]        Your Vitals Were     Pulse Temperature Height Last Period Pulse Oximetry Breastfeeding?    73 97.8  F (36.6  C) (Tympanic) 5' 7\" (1.702 m) (LMP Unknown) 96% No    BMI (Body Mass Index)                   40.72 kg/m2            Blood Pressure from Last 3 Encounters:   12/01/17 136/82   01/23/17 (!) 151/96   11/07/16 149/83    Weight from Last 3 Encounters:   12/01/17 260 lb (117.9 kg)   01/23/17 251 lb (113.9 kg)   11/07/16 247 lb 14.4 oz (112.4 kg)              We Performed the Following     CBC with platelets     Comprehensive metabolic panel     Vitamin D Deficiency          Today's Medication Changes          These changes are accurate as of: 12/1/17 12:48 PM.  If you have any questions, ask your nurse or doctor.               These medicines have changed or have updated prescriptions.        Dose/Directions    acyclovir 5 % cream   Commonly known as:  ZOVIRAX   This may have changed:  additional instructions   Used for:  Recurrent cold sores   Changed by:  Jackelyn Barros DO        Apply topically 5 times daily As needed for cold sore outbreaks   Quantity:  5 g   Refills:  1       lisinopril 10 MG tablet   Commonly known as:  PRINIVIL/ZESTRIL   This may have changed:  See the new instructions.   Used for:  Essential hypertension with goal blood pressure less than 140/90   Changed by:  Jackelyn Barros DO        Dose:  10 mg   Take 1 tablet (10 mg) by mouth daily   Quantity:  90 tablet   Refills:  3         Stop taking these medicines if you haven't already. Please contact your care team if you have questions.     cyanocobalamin 1000 MCG tablet   Commonly known as:  vitamin  B-12   Stopped by:  " Jackelyn Barros,            phentermine 15 MG capsule   Stopped by:  Jackelyn Barros DO           topiramate 25 MG tablet   Commonly known as:  TOPAMAX   Stopped by:  Jackelyn Barros DO           triamcinolone 0.1 % lotion   Commonly known as:  KENALOG   Stopped by:  Jackelyn Barros DO           Zinc-Vitamin C  MG Lozg   Stopped by:  Jackelyn Barros DO                Where to get your medicines      These medications were sent to [facility] [address] [place] [address] AVE AT [address] [address]     Phone:  [phone]     acyclovir 5 % cream    lisinopril 10 MG tablet                Primary Care Provider Office Phone # Fax #    Jackelyn Barros [phone] [address] [address]        Equal Access to Services     [facility] Center[facility] AH: Hadii aad ku hadasho Soomaali, waaxda luqadaha, qaybta kaalmada adeegyada, waxay idiin hayaan adeeg kharamatthew lapurnima . So [facility] [phone].    ATENCIÓN: Si habla español, tiene a yarbrough disposición servicios gratuitos de asistencia lingüística. [facility] [phone].    We comply with applicable federal civil rights laws and [place] laws. We do not discriminate on the basis of race, color, national origin, age, disability, sex, sexual orientation, or gender identity.            Thank you!     Thank you for choosing [facility]  for your care. Our goal is always to provide you with excellent care. Hearing back from our patients is one way we can continue to improve our services. Please take a few minutes to complete the written survey that you may receive in the mail after your visit with us. Thank you!             Your Updated Medication List - Protect others around you: Learn how to safely use, store and throw away your medicines at www.disposemymeds.org.          This list is accurate as of: 12/1/17 12:48 PM.  Always use your most recent  med list.                   Brand Name Dispense Instructions for use Diagnosis    acyclovir 5 % cream    ZOVIRAX    5 g    Apply topically 5 times daily As needed for cold sore outbreaks    Recurrent cold sores       DHEA PO           lisinopril 10 MG tablet    PRINIVIL/ZESTRIL    90 tablet    Take 1 tablet (10 mg) by mouth daily    Essential hypertension with goal blood pressure less than 140/90       LYSINE PO           rivaroxaban ANTICOAGULANT 20 MG Tabs tablet    XARELTO    90 tablet    Take 1 tablet (20 mg) by mouth daily (with dinner)    DVT (deep venous thrombosis), bilateral       valACYclovir 1000 mg tablet    VALTREX    12 tablet    Take 2 tablets (2,000 mg) by mouth 2 times daily    Recurrent cold sores       vitamin B complex with vitamin C Tabs tablet      Take 1 tablet by mouth daily        VITAMIN D (CHOLECALCIFEROL) PO      Take 3,000 Units by mouth daily

## 2017-12-01 NOTE — NURSING NOTE
"Chief Complaint   Patient presents with     Wellness Visit       Initial /82 (BP Location: Right arm, Cuff Size: Adult Large)  Pulse 73  Temp 97.8  F (36.6  C) (Tympanic)  Ht 5' 7\" (1.702 m)  Wt 260 lb (117.9 kg)  LMP  (LMP Unknown)  SpO2 96%  Breastfeeding? No  BMI 40.72 kg/m2 Estimated body mass index is 40.72 kg/(m^2) as calculated from the following:    Height as of this encounter: 5' 7\" (1.702 m).    Weight as of this encounter: 260 lb (117.9 kg).  Medication Reconciliation: complete Linda Mccoy MA      "

## 2017-12-02 LAB
ALBUMIN SERPL-MCNC: 3.8 G/DL (ref 3.4–5)
ALP SERPL-CCNC: 113 U/L (ref 40–150)
ALT SERPL W P-5'-P-CCNC: 24 U/L (ref 0–50)
ANION GAP SERPL CALCULATED.3IONS-SCNC: 10 MMOL/L (ref 3–14)
AST SERPL W P-5'-P-CCNC: 16 U/L (ref 0–45)
BILIRUB SERPL-MCNC: 0.3 MG/DL (ref 0.2–1.3)
BUN SERPL-MCNC: 18 MG/DL (ref 7–30)
CALCIUM SERPL-MCNC: 9 MG/DL (ref 8.5–10.1)
CHLORIDE SERPL-SCNC: 109 MMOL/L (ref 94–109)
CO2 SERPL-SCNC: 22 MMOL/L (ref 20–32)
CREAT SERPL-MCNC: 0.77 MG/DL (ref 0.52–1.04)
GFR SERPL CREATININE-BSD FRML MDRD: 75 ML/MIN/1.7M2
GLUCOSE SERPL-MCNC: 104 MG/DL (ref 70–99)
POTASSIUM SERPL-SCNC: 4.3 MMOL/L (ref 3.4–5.3)
PROT SERPL-MCNC: 6.8 G/DL (ref 6.8–8.8)
SODIUM SERPL-SCNC: 141 MMOL/L (ref 133–144)

## 2017-12-04 LAB — DEPRECATED CALCIDIOL+CALCIFEROL SERPL-MC: 47 UG/L (ref 20–75)

## 2017-12-17 PROBLEM — E66.01 MORBID OBESITY (H): Status: ACTIVE | Noted: 2017-12-17

## 2017-12-17 PROBLEM — E66.01 MORBID OBESITY (H): Chronic | Status: ACTIVE | Noted: 2017-12-17

## 2018-01-15 ENCOUNTER — HOSPITAL ENCOUNTER (OUTPATIENT)
Dept: MAMMOGRAPHY | Facility: CLINIC | Age: 68
Discharge: HOME | End: 2018-01-15
Attending: INTERNAL MEDICINE | Admitting: INTERNAL MEDICINE
Payer: MEDICARE

## 2018-01-15 ENCOUNTER — TRANSFERRED RECORDS (OUTPATIENT)
Dept: HEALTH INFORMATION MANAGEMENT | Facility: CLINIC | Age: 68
End: 2018-01-15

## 2018-01-15 DIAGNOSIS — Z12.31 VISIT FOR SCREENING MAMMOGRAM: ICD-10-CM

## 2018-01-15 PROCEDURE — 77063 BREAST TOMOSYNTHESIS BI: CPT

## 2018-04-05 ENCOUNTER — HOSPITAL ENCOUNTER (OUTPATIENT)
Dept: ULTRASOUND IMAGING | Facility: CLINIC | Age: 68
Discharge: HOME | End: 2018-04-05
Attending: NURSE PRACTITIONER | Admitting: NURSE PRACTITIONER
Payer: MEDICARE

## 2018-04-05 ENCOUNTER — OFFICE VISIT (OUTPATIENT)
Dept: FAMILY MEDICINE | Facility: CLINIC | Age: 68
End: 2018-04-05
Payer: COMMERCIAL

## 2018-04-05 ENCOUNTER — TELEPHONE (OUTPATIENT)
Dept: FAMILY MEDICINE | Facility: CLINIC | Age: 68
End: 2018-04-05

## 2018-04-05 VITALS
WEIGHT: 249 LBS | HEIGHT: 67 IN | RESPIRATION RATE: 18 BRPM | HEART RATE: 64 BPM | TEMPERATURE: 98.8 F | DIASTOLIC BLOOD PRESSURE: 85 MMHG | BODY MASS INDEX: 39.08 KG/M2 | SYSTOLIC BLOOD PRESSURE: 157 MMHG | OXYGEN SATURATION: 96 %

## 2018-04-05 DIAGNOSIS — M79.89 LEG SWELLING: ICD-10-CM

## 2018-04-05 DIAGNOSIS — M79.89 LEG SWELLING: Primary | ICD-10-CM

## 2018-04-05 DIAGNOSIS — Z86.718 PERSONAL HISTORY OF DVT (DEEP VEIN THROMBOSIS): ICD-10-CM

## 2018-04-05 DIAGNOSIS — D68.51 FACTOR V LEIDEN (H): ICD-10-CM

## 2018-04-05 DIAGNOSIS — Z79.01 LONG TERM CURRENT USE OF ANTICOAGULANT THERAPY: ICD-10-CM

## 2018-04-05 PROCEDURE — 99213 OFFICE O/P EST LOW 20 MIN: CPT | Performed by: NURSE PRACTITIONER

## 2018-04-05 PROCEDURE — 93971 EXTREMITY STUDY: CPT | Mod: LT

## 2018-04-05 NOTE — TELEPHONE ENCOUNTER
"Pt is scheduled today 4/5/18 at 2:30pm with Mehreen Sharma alerted triage that pt has not been triaged for symptoms      Spoke with patient (reached pt at mobile number)  Symptoms started 2-3 weeks ago   Tightness/pain in left leg calf - achy, sometimes squeezing sensation \"occasionally feels like a tourniquet\" and left calf is 1\" greater than right side   Pain was \"a little worse last night\" - woke up with pain   Pain and discomfort is mid-calf to mid-thigh - right behind knee is the worst; pain centralized to calf   Always has some redness on both lower legs - no worse than usual, no splotchy redness   Has dx of Factor 5 Leiden mutation, heterozygous (H)  1st clot 2006 left leg, right leg several year after   On Xarelto x5 years (per Hematology, Dr. Sharif) - has not seen recently - cannot remember how long it's been since she saw him (several years)   No chest pain or SOB; no drooping face, no speech changes, no one-sided weakness - pt knows to go to ER if she develops any of these    Per pt, she usually has US - [facility]     Huddled with Mehreen  Will plan to see pt as scheduled today    Erlinda ISLAS RN    "

## 2018-04-05 NOTE — MR AVS SNAPSHOT
After Visit Summary   4/5/2018    Ariadna Duran    MRN: [id number]           Patient Information     Date Of Birth          1950        Visit Information        Provider Department      4/5/2018 2:30 PM Mehreen Ellis APRN CNP [facility]        Today's Diagnoses     Leg swelling    -  1    Factor V Leiden (H)        Personal history of DVT (deep vein thrombosis)        Long term current use of anticoagulant therapy           Follow-ups after your visit        Future tests that were ordered for you today     Open Future Orders        Priority Expected Expires Ordered    US Lower Extremity Venous Duplex Left Routine  4/5/2019 4/5/2018            Who to contact     If you have questions or need follow up information about today's clinic visit or your schedule please contact [facility] directly at [phone].  Normal or non-critical lab and imaging results will be communicated to you by MyChart, letter or phone within 4 business days after the clinic has received the results. If you do not hear from us within 7 days, please contact the clinic through MyChart or phone. If you have a critical or abnormal lab result, we will notify you by phone as soon as possible.  Submit refill requests through afterBOT or call your pharmacy and they will forward the refill request to us. Please allow 3 business days for your refill to be completed.          Additional Information About Your Visit        MyChart Information     afterBOT gives you secure access to your electronic health record. If you see a primary care provider, you can also send messages to your care team and make appointments. If you have questions, please call your primary care clinic.  If you do not have a primary care provider, please call [phone] and they will assist you.        Care EveryWhere ID     This is your Care EveryWhere ID. This could be used by other organizations to access your [facility]  "records  [id number]        Your Vitals Were     Pulse Temperature Respirations Height Last Period Pulse Oximetry    64 98.8  F (37.1  C) (Tympanic) 18 5' 7\" (1.702 m) (LMP Unknown) 96%    BMI (Body Mass Index)                   39 kg/m2            Blood Pressure from Last 3 Encounters:   04/05/18 157/85   12/01/17 136/82   01/23/17 (!) 151/96    Weight from Last 3 Encounters:   04/05/18 249 lb (112.9 kg)   12/01/17 260 lb (117.9 kg)   01/23/17 251 lb (113.9 kg)               Primary Care Provider Office Phone # Fax #    Jackelyn Barros,  [phone] [address] [address]        Equal Access to Services     REYNOLD MARTÍNEZ : Hadii aad ku hadasho Soomaali, waaxda luqadaha, qaybta kaalmada adeegyada, damon javierin haymandeep levin . So [facility] [phone].    ATENCIÓN: Si habla español, tiene a yarbrough disposición servicios gratuitos de asistencia lingüística. Anna al [phone].    We comply with applicable federal civil rights laws and [place] laws. We do not discriminate on the basis of race, color, national origin, age, disability, sex, sexual orientation, or gender identity.            Thank you!     Thank you for choosing [facility]  for your care. Our goal is always to provide you with excellent care. Hearing back from our patients is one way we can continue to improve our services. Please take a few minutes to complete the written survey that you may receive in the mail after your visit with us. Thank you!             Your Updated Medication List - Protect others around you: Learn how to safely use, store and throw away your medicines at www.disposemymeds.org.          This list is accurate as of 4/5/18  4:16 PM.  Always use your most recent med list.                   Brand Name Dispense Instructions for use Diagnosis    acyclovir 5 % cream    ZOVIRAX    5 g    Apply topically 5 times daily As needed for cold sore outbreaks    Recurrent cold sores "       DHEA PO           lisinopril 10 MG tablet    PRINIVIL/ZESTRIL    90 tablet    Take 1 tablet (10 mg) by mouth daily    Essential hypertension with goal blood pressure less than 140/90       LYSINE PO           rivaroxaban ANTICOAGULANT 20 MG Tabs tablet    XARELTO    90 tablet    Take 1 tablet (20 mg) by mouth daily (with dinner)    DVT (deep venous thrombosis), bilateral       vitamin B complex with vitamin C Tabs tablet      Take 1 tablet by mouth daily        VITAMIN D (CHOLECALCIFEROL) PO      Take 3,000 Units by mouth daily

## 2018-04-05 NOTE — PROGRESS NOTES
"  SUBJECTIVE:                                                    Ariadna Duran is a 67 year old female who presents to clinic today for the following health issues:        Calf Pain      Duration: 2-3 weeks    Description (location/character/radiation): Tightness/ache in Lt Calf- \"Occasionally feels like a tourniquet\" pain mostly in left calf and popliteal area, left calf one inch bigger than usual.     Intensity:  Pain can be intense but is variable coming and going throughout the day    Accompanying signs and symptoms: swelling; Pain/tightness; Redness-Not new; No Chest pain; No SOB, no headache or abdominal pain    History (similar episodes/previous evaluation): Hx Factor 5 Leiden Mutation, Heterozygous; Hx of DVT LLE in 2006 and later RLE and subsequently hospitalized given heparin discharged on lovenox and coumadin and now on xarelto    Precipitating or alleviating factors: None    Therapies tried and outcome: None    Hematologist is Dr Kay ALLEN [phone]       Problem list and histories reviewed & adjusted, as indicated.  Additional history: as documented    Patient Active Problem List   Diagnosis     History of deep venous thrombosis     Intestinal Malabsorption - s/p gastric bipass     Vitamin D deficiency     Hypertension goal BP (blood pressure) < 140/90     GERD (gastroesophageal reflux disease)     Recurrent cold sores     Factor 5 Leiden mutation, heterozygous (H)     Morbid obesity (H)     Past Surgical History:   Procedure Laterality Date     ABDOMEN SURGERY  1999    roverto     C GASTROPLASTY,OBESITY,VERT BAND  05/2000, 2010    x 2 RuxenY the second time     COLONOSCOPY  2015     COSMETIC BLEPHAROPLASTY LOWER LIDS BILATERAL  7/31/2012    Procedure: COSMETIC BLEPHAROPLASTY LOWER LIDS BILATERAL;  BILATERAL UPPER LID PTOSIS REPAIR, BILATERAL LOWER LID COSMETIC BLEPHAROPLASTY ;  Surgeon: Simón Layne MD;  Location: [facility] REMOVAL GALLBLADDER  05/2000     HERNIA REPAIR  4/2007    " abdominal     REPAIR PTOSIS BILATERAL  2012    Procedure: REPAIR PTOSIS BILATERAL;;  Surgeon: Simón Layne MD;  Location: [facility]     VASCULAR SURGERY  2007;     blood clots LLE and RLE       Social History   Substance Use Topics     Smoking status: Former Smoker     Packs/day: 0.50     Years: 3.00     Types: Cigarettes     Quit date: 1972     Smokeless tobacco: Never Used      Comment:  Pt quit smoking  (5 years)     Alcohol use 0.0 oz/week      Comment: 2-3 x week     Family History   Problem Relation Age of Onset     HEART DISEASE Mother      Lived to age 96     Hypertension Mother      Breast Cancer Mother      lumpectomy ()     Asthma Mother      Thyroid Disease Mother      goiter removal     Dementia Mother      Neurologic Disorder Father      parkinsons;  at 86 yo with supranuclear palsy     CANCER Maternal Grandmother      CEREBROVASCULAR DISEASE Maternal Grandfather      Hypertension Sister      Depression Daughter      Thyroid Disease Daughter      hypo thyroid     Obesity Daughter      Anxiety Disorder Daughter      Obesity Daughter      Genetic Disorder Niece      factor 5         Current Outpatient Prescriptions   Medication Sig Dispense Refill     lisinopril (PRINIVIL/ZESTRIL) 10 MG tablet Take 1 tablet (10 mg) by mouth daily 90 tablet 3     acyclovir (ZOVIRAX) 5 % cream Apply topically 5 times daily As needed for cold sore outbreaks 5 g 1     VITAMIN D, CHOLECALCIFEROL, PO Take 3,000 Units by mouth daily       Nutritional Supplements (DHEA PO)        vitamin B complex with vitamin C (VITAMIN  B COMPLEX) TABS tablet Take 1 tablet by mouth daily       LYSINE PO        rivaroxaban ANTICOAGULANT (XARELTO) 20 MG TABS tablet Take 1 tablet (20 mg) by mouth daily (with dinner) 90 tablet 1     No Known Allergies    ROS:  Constitutional, HEENT, cardiovascular, pulmonary, gi and gu systems are negative, except as otherwise noted.    OBJECTIVE:     /85 (BP Location: Left  "arm, Patient Position: Chair, Cuff Size: Adult Regular)  Pulse 64  Temp 98.8  F (37.1  C) (Tympanic)  Resp 18  Ht 5' 7\" (1.702 m)  Wt 249 lb (112.9 kg)  LMP  (LMP Unknown)  SpO2 96%  BMI 39 kg/m2  Body mass index is 39 kg/(m^2).  GENERAL: healthy, alert and no distress  EYES: Eyes grossly normal to inspection, PERRL and conjunctivae and sclerae normal  RESP: lungs clear to auscultation - no rales, rhonchi or wheezes  CV: regular rate and rhythm, normal S1 S2, no S3 or S4, no murmur, click or rub, no peripheral edema and peripheral pulses strong  MS: no gross musculoskeletal defects noted, no edema of LLE, no pain palpable in popliteal space, no calf pain elicited on exam of either calf   SKIN: no suspicious lesions or rashes  PSYCH: mentation appears normal, affect normal/bright    Diagnostic Test Results:  Ultrasound of LLE; negative for DVT    ASSESSMENT/PLAN:       ICD-10-CM    1. Leg swelling M79.89 US Lower Extremity Venous Duplex Left   2. Factor V Leiden (H) D68.51    3. Personal history of DVT (deep vein thrombosis) Z86.718    4. Long term current use of anticoagulant therapy Z79.01    this could be a ruptured baker's cyst  Will await final read  Mrs Morenoilton informed       VALENTINA [facility]      "

## 2018-04-05 NOTE — NURSING NOTE
"Chief Complaint   Patient presents with     Musculoskeletal Problem       Initial /85 (BP Location: Left arm, Patient Position: Chair, Cuff Size: Adult Regular)  Pulse 64  Temp 98.8  F (37.1  C) (Tympanic)  Resp 18  Ht 5' 7\" (1.702 m)  Wt 249 lb (112.9 kg)  LMP  (LMP Unknown)  SpO2 96%  BMI 39 kg/m2 Estimated body mass index is 39 kg/(m^2) as calculated from the following:    Height as of this encounter: 5' 7\" (1.702 m).    Weight as of this encounter: 249 lb (112.9 kg).  Medication Reconciliation: complete   Indu Self CMA (AAMA)      "

## 2018-04-10 LAB
CREAT SERPL-MCNC: 0.71 MG/DL (ref 0.5–0.99)
GFR SERPL CREATININE-BSD FRML MDRD: 89 ML/MIN/1.73M2
GLUCOSE SERPL-MCNC: 87 MG/DL (ref 65–99)
POTASSIUM SERPL-SCNC: 4.3 MMOL/L (ref 3.5–5.3)
TSH SERPL-ACNC: 3.55 UIU/ML (ref 0.3–5)

## 2018-11-06 DIAGNOSIS — I10 ESSENTIAL HYPERTENSION WITH GOAL BLOOD PRESSURE LESS THAN 140/90: Chronic | ICD-10-CM

## 2018-11-06 RX ORDER — LISINOPRIL 10 MG/1
TABLET ORAL
Qty: 90 TABLET | Refills: 0 | Status: SHIPPED | OUTPATIENT
Start: 2018-11-06 | End: 2018-12-04

## 2018-12-04 ENCOUNTER — OFFICE VISIT (OUTPATIENT)
Dept: FAMILY MEDICINE | Facility: CLINIC | Age: 68
End: 2018-12-04
Payer: COMMERCIAL

## 2018-12-04 VITALS
SYSTOLIC BLOOD PRESSURE: 118 MMHG | OXYGEN SATURATION: 95 % | TEMPERATURE: 97.5 F | DIASTOLIC BLOOD PRESSURE: 70 MMHG | HEIGHT: 67 IN | BODY MASS INDEX: 34.21 KG/M2 | HEART RATE: 71 BPM | WEIGHT: 218 LBS

## 2018-12-04 DIAGNOSIS — E55.9 VITAMIN D DEFICIENCY: Chronic | ICD-10-CM

## 2018-12-04 DIAGNOSIS — Z12.4 SCREENING FOR CERVICAL CANCER: ICD-10-CM

## 2018-12-04 DIAGNOSIS — I82.409 RECURRENT DEEP VEIN THROMBOSIS (DVT) (H): ICD-10-CM

## 2018-12-04 DIAGNOSIS — Z00.00 MEDICARE ANNUAL WELLNESS VISIT, SUBSEQUENT: Primary | ICD-10-CM

## 2018-12-04 DIAGNOSIS — I10 ESSENTIAL HYPERTENSION WITH GOAL BLOOD PRESSURE LESS THAN 140/90: Chronic | ICD-10-CM

## 2018-12-04 DIAGNOSIS — E66.811 CLASS 1 OBESITY WITHOUT SERIOUS COMORBIDITY WITH BODY MASS INDEX (BMI) OF 34.0 TO 34.9 IN ADULT, UNSPECIFIED OBESITY TYPE: Chronic | ICD-10-CM

## 2018-12-04 DIAGNOSIS — B00.1 RECURRENT COLD SORES: Chronic | ICD-10-CM

## 2018-12-04 LAB
ERYTHROCYTE [DISTWIDTH] IN BLOOD BY AUTOMATED COUNT: 14.6 % (ref 10–15)
HCT VFR BLD AUTO: 38.2 % (ref 35–47)
HGB BLD-MCNC: 11.7 G/DL (ref 11.7–15.7)
MCH RBC QN AUTO: 29.6 PG (ref 26.5–33)
MCHC RBC AUTO-ENTMCNC: 30.6 G/DL (ref 31.5–36.5)
MCV RBC AUTO: 97 FL (ref 78–100)
PLATELET # BLD AUTO: 297 10E9/L (ref 150–450)
RBC # BLD AUTO: 3.95 10E12/L (ref 3.8–5.2)
WBC # BLD AUTO: 5.2 10E9/L (ref 4–11)

## 2018-12-04 PROCEDURE — 99397 PER PM REEVAL EST PAT 65+ YR: CPT | Performed by: INTERNAL MEDICINE

## 2018-12-04 PROCEDURE — 36415 COLL VENOUS BLD VENIPUNCTURE: CPT | Performed by: INTERNAL MEDICINE

## 2018-12-04 PROCEDURE — 85027 COMPLETE CBC AUTOMATED: CPT | Performed by: INTERNAL MEDICINE

## 2018-12-04 PROCEDURE — G0476 HPV COMBO ASSAY CA SCREEN: HCPCS | Performed by: INTERNAL MEDICINE

## 2018-12-04 PROCEDURE — 80061 LIPID PANEL: CPT | Performed by: INTERNAL MEDICINE

## 2018-12-04 PROCEDURE — 80053 COMPREHEN METABOLIC PANEL: CPT | Performed by: INTERNAL MEDICINE

## 2018-12-04 PROCEDURE — G0145 SCR C/V CYTO,THINLAYER,RESCR: HCPCS | Performed by: INTERNAL MEDICINE

## 2018-12-04 PROCEDURE — 82306 VITAMIN D 25 HYDROXY: CPT | Performed by: INTERNAL MEDICINE

## 2018-12-04 RX ORDER — LISINOPRIL 10 MG/1
TABLET ORAL
Qty: 90 TABLET | Refills: 3 | Status: SHIPPED | OUTPATIENT
Start: 2018-12-04 | End: 2019-07-24

## 2018-12-04 RX ORDER — ACYCLOVIR 50 MG/G
CREAM TOPICAL
Qty: 5 G | Refills: 1 | Status: SHIPPED | OUTPATIENT
Start: 2018-12-04 | End: 2019-07-12

## 2018-12-04 NOTE — PATIENT INSTRUCTIONS
Labs today    Keep up the awesome work!    Shingrix  is:  1) Recommended for healthy adults aged 50 years and older to help prevent shingles and its related complications such as pain related to the shingles virus  2) Recommended for adults who previously received the previous shingles vaccine (Zostavax )  3) The preferred vaccine for helping to prevent shingles and its related complications  4) It is a series of TWO vaccines with the second dose administered between 2-6 months after the first dose in this series  5) PLEASE CHECK COST WITH YOUR INSURANCE COMPANY OR YOUR LOCAL PHARMACY AS EACH DOSE MAY BE AS MUCH AS APPROXIMATELY $300 IF NOT COVERED BY INSURANCE    Establish with Dr. Kang in the future - at least annually or as needed    Preventive Health Recommendations  See your health care provider every year to    Review health changes.     Discuss preventive care.      Review your medicines if your doctor has prescribed any.      You no longer need a yearly Pap test unless you've had an abnormal Pap test in the past 10 years. If you have vaginal symptoms, such as bleeding or discharge, be sure to talk with your provider about a Pap test.      Every 1 to 2 years, have a mammogram.  If you are over 69, talk with your health care provider about whether or not you want to continue having screening mammograms.      Every 10 years, have a colonoscopy. Or, have a yearly FIT test (stool test). These exams will check for colon cancer.       Have a cholesterol test every 5 years, or more often if your doctor advises it.       Have a diabetes test (fasting glucose) every three years. If you are at risk for diabetes, you should have this test more often.       At age 65, have a bone density scan (DEXA) to check for osteoporosis (brittle bone disease).    Shots:    Get a flu shot each year.    Get a tetanus shot every 10 years.    Talk to your doctor about your pneumonia vaccines. There are now two you should receive -  Pneumovax (PPSV 23) and Prevnar (PCV 13).    Talk to your pharmacist about the shingles vaccine.    Talk to your doctor about the hepatitis B vaccine.    Nutrition:     Eat at least 5 servings of fruits and vegetables each day.      Eat whole-grain bread, whole-wheat pasta and brown rice instead of white grains and rice.      Get adequate Calcium and Vitamin D.     Lifestyle    Exercise at least 150 minutes a week (30 minutes a day, 5 days a week). This will help you control your weight and prevent disease.      Limit alcohol to one drink per day.      No smoking.       Wear sunscreen to prevent skin cancer.       See your dentist twice a year for an exam and cleaning.      See your eye doctor every 1 to 2 years to screen for conditions such as glaucoma, macular degeneration and cataracts.    Personalized Prevention Plan  You are due for the preventive services outlined below.  Your care team is available to assist you in scheduling these services.  If you have already completed any of these items, please share that information with your care team to update in your medical record.  Health Maintenance Due   Topic Date Due     Bone Density Screening (Dexa)  08/24/2016     Pneumococcal Vaccine (1 of 2 - PCV13) 08/24/2016     Discuss Advance Directive Planning  03/23/2017     Flu Vaccine (1) 09/01/2018     Basic Metabolic Lab - yearly  12/01/2018     FALL RISK ASSESSMENT  12/01/2018     Depression Assessment 2 - yearly  12/01/2018

## 2018-12-04 NOTE — PROGRESS NOTES
"    SUBJECTIVE:   Ariadna Duran is a 68 year old female who presents for Preventive Visit.  Are you in the first 12 months of your Medicare Part B coverage?  No    Physical Health:    In general, how would you rate your overall physical health? good    Outside of work, how many days during the week do you exercise? Every day, amount changes depending on season    Outside of work, approximately how many minutes a day do you exercise?45-60 minutes    If you drink alcohol do you typically have >3 drinks per day or >7 drinks per week? No    Do you usually eat at least 4 servings of fruit and vegetables a day, include whole grains & fiber and avoid regularly eating high fat or \"junk\" foods? Yes    Do you have any problems taking medications regularly?  No    Do you have any side effects from medications? none    Needs assistance for the following daily activities: no assistance needed    Which of the following safety concerns are present in your home?  none identified     Hearing impairment: Yes, Difficulty following a conversation in a noisy restaurant or crowded room.    In the past 6 months, have you been bothered by leaking of urine? no    Mental Health:    In general, how would you rate your overall mental or emotional health? good  PHQ-2 Score:     PHQ-2 ( 1999 Pfizer) 12/4/2018 12/1/2017   Q1: Little interest or pleasure in doing things 0 0   Q2: Feeling down, depressed or hopeless 0 0   PHQ-2 Score 0 0       Do you feel safe in your environment? Yes    Do you have a Health Care Directive? Yes: Patient states has Advance Directive and will bring in a copy to clinic.    Additional concerns to address?  Ariadna wasn't able to locate prior pap smears (see notes from last year) so will complete screening today  A spot on back for 5 yrs that maybe is actually smaller but she wants me to take a look at it (it turns out is a seborrheic keratosis so she is reassured)  Has been doing a great job on carb reduction and thus " weight loss and feels so much better;  doing low carb with her too which helps  Carb cravings reduced after the first couple weeks  No longer following with Dr. Maciel on medications  Does follow with endocrinologist Dr. Bobo on thyroid nodule   Dr. Sharif from [facility] still Rx Xarelto chronically b/c of recurrent clot and h/o Factor V Leiden (heterozygous) but she'd be fine with me filling it as well given stability  No acute concerns to discuss    Fall risk:  Fallen 2 or more times in the past year?: No  Any fall with injury in the past year?: No    Cognitive Screenin) Repeat 3 items (Leader, Season, Table)    2) Clock draw: NORMAL  3) 3 item recall: Recalls 2 objects   Results: NORMAL clock, 1-2 items recalled: COGNITIVE IMPAIRMENT LESS LIKELY    Mini-CogTM Copyright [employer]. Licensed by the author for use in [facility]; reprinted with permission ([email or web address]). All rights reserved.      Do you have sleep apnea, excessive snoring or daytime drowsiness?: no      Wt Readings from Last 4 Encounters:   18 98.9 kg (218 lb)   18 112.9 kg (249 lb)   17 117.9 kg (260 lb)   17 113.9 kg (251 lb)       Problems  Fam Hx        Social History     Tobacco Use     Smoking status: Former Smoker     Packs/day: 0.50     Years: 3.00     Pack years: 1.50     Types: Cigarettes     Last attempt to quit: 1972     Years since quittin.0     Smokeless tobacco: Never Used     Tobacco comment:  Pt quit smoking  (5 years)   Substance Use Topics     Alcohol use: Yes     Alcohol/week: 0.0 oz     Comment: 2-3 x week                           Current providers sharing in care for this patient include:   Patient Care Team:  Jackelyn Barros DO as PCP - General (Internal Medicine)  Jackelyn Barros DO as PCP - Assigned PCP  Martha Balbuena MD as MD (INTERNAL MEDICINE - ENDOCRINOLOGY, DIABETES & METABOLISM)    The following health maintenance items are reviewed  "in Epic and correct as of today:  Health Maintenance   Topic Date Due     ZOSTER IMMUNIZATION (1 of 2) 08/24/2001     DEXA SCAN SCREENING (SYSTEM ASSIGNED)  08/24/2016     PNEUMOVAX IMMUNIZATION 65+ LOW/MEDIUM RISK (1 of 2 - PCV13) 08/24/2016     ADVANCE DIRECTIVE PLANNING Q5 YRS  03/23/2017     INFLUENZA VACCINE (1) 09/01/2018     BMP Q1 YR  12/04/2019     FALL RISK ASSESSMENT  12/04/2019     PHQ-2 Q1 YR  12/04/2019     MAMMO SCREEN Q2 YR (SYSTEM ASSIGNED)  01/15/2020     LIPID SCREEN Q5 YR FEMALE (SYSTEM ASSIGNED)  12/04/2023     DTAP/TDAP/TD IMMUNIZATION (2 - Td) 06/17/2024     COLONOSCOPY Q10 YR  07/28/2024     HEPATITIS C SCREENING  Completed     IPV IMMUNIZATION  Aged Out     MENINGITIS IMMUNIZATION  Aged Out       ROS:  Comprehensive ROS negative unless as stated above in HPI.     OBJECTIVE:   /70 (BP Location: Right arm, Patient Position: Sitting, Cuff Size: Adult Large)   Pulse 71   Temp 97.5  F (36.4  C) (Oral)   Ht 1.702 m (5' 7\")   Wt 98.9 kg (218 lb)   LMP  (LMP Unknown)   SpO2 95%   BMI 34.14 kg/m   Estimated body mass index is 34.14 kg/m  as calculated from the following:    Height as of this encounter: 1.702 m (5' 7\").    Weight as of this encounter: 98.9 kg (218 lb).  EXAM:   GENERAL APPEARANCE: healthy, alert and no distress, noticeably thinner compared to our last visit  EYES: Eyes grossly normal to inspection, PERRL and conjunctivae and sclerae normal  HENT: ear canals and TM's normal, nose and mouth without ulcers or lesions, oropharynx clear and oral mucous membranes moist  NECK: no adenopathy, no asymmetry, masses, or scars and thyroid normal to palpation  RESP: lungs clear to auscultation - no rales, rhonchi or wheezes  CV: regular rate and rhythm, normal S1 S2, no S3 or S4, no murmur, click or rub, no peripheral edema and no carotid bruits  ABDOMEN: soft, nontender, no hepatosplenomegaly, no masses and bowel sounds normal   (female): normal female external genitalia, normal " urethral meatus, vaginal mucosal atrophy noted, normal cervix, adnexae, and uterus without masses or abnormal discharge  MS: no musculoskeletal defects are noted and gait is age appropriate without ataxia  SKIN: no suspicious lesions or rashes; scattered SKs  NEURO: Normal strength and tone, mentation intact and speech normal  PSYCH: mentation appears normal and affect normal/bright    ASSESSMENT / PLAN:   1. Medicare annual wellness visit, subsequent  She would prefer DEXA next year when she has her mammogram  Pap today  Normal colonoscopy July 2014  - Lipid panel reflex to direct LDL Fasting    2. Essential hypertension with goal blood pressure less than 140/90  At goal on Lisinopril; advised to monitor BP as she continues to lose weight   - lisinopril (PRINIVIL/ZESTRIL) 10 MG tablet; TAKE 1 TABLET(10 MG) BY MOUTH DAILY  Dispense: 90 tablet; Refill: 3  - Comprehensive metabolic panel    3. Recurrent cold sores  Topical cream helpful  - acyclovir (ZOVIRAX) 5 % external cream; Apply topically 5 times daily As needed for cold sore outbreaks  Dispense: 5 g; Refill: 1    4. Recurrent deep vein thrombosis (DVT) (H)  H/o following with Dr. Sharif b/c of recurrent clot and h/o Factor V Leiden (heterozygous)  - CBC with platelets  - rivaroxaban ANTICOAGULANT (XARELTO) 20 MG TABS tablet; Take 1 tablet (20 mg) by mouth daily (with dinner)  Dispense: 90 tablet; Refill: 3    5. Class 1 obesity without serious comorbidity with body mass index (BMI) of 34.0 to 34.9 in adult, unspecified obesity type  Remarkable job on weight loss over the past year by cutting down on carbs along with her !  H/o remote weight loss surgery    6. Vitamin D deficiency  Recheck labs  - Vitamin D Deficiency    7. H/o thyroid nodule  Follows with endocrinologist Dr. Bobo    8. Screening for cervical cancer  No h/o abnormal pap smear; if this pap smear is within normal limits then she can be done with screening pap smears based on current  "guidelines  - Pap imaged thin layer screen with HPV - recommended age 30 - 65 years (select HPV order below)  - HPV High Risk Types DNA Cervical    End of Life Planning:  Patient currently has an advanced directive: Yes: Patient states has Advance Directive and will bring in a copy to clinic.    COUNSELING:  Reviewed preventive health counseling, as reflected in patient instructions       Regular exercise       Healthy diet/nutrition    BP Readings from Last 1 Encounters:   12/04/18 118/70     Estimated body mass index is 34.14 kg/m  as calculated from the following:    Height as of this encounter: 1.702 m (5' 7\").    Weight as of this encounter: 98.9 kg (218 lb).      Weight management plan: Discussed healthy diet and exercise guidelines     reports that she quit smoking about 47 years ago. Her smoking use included cigarettes. She has a 1.50 pack-year smoking history. she has never used smokeless tobacco.      Appropriate preventive services were discussed with this patient, including applicable screening as appropriate for cardiovascular disease, diabetes, osteopenia/osteoporosis, and glaucoma.  As appropriate for age/gender, discussed screening for colorectal cancer, prostate cancer, breast cancer, and cervical cancer. Checklist reviewing preventive services available has been given to the patient.    Reviewed patients plan of care and provided an AVS. The Intermediate Care Plan ( asthma action plan, low back pain action plan, and migraine action plan) for Ariadna meets the Care Plan requirement. This Care Plan has been established and reviewed with the Patient.    Patient Instructions     Labs today    Keep up the awesome work!    Shingrix  is:  1) Recommended for healthy adults aged 50 years and older to help prevent shingles and its related complications such as pain related to the shingles virus  2) Recommended for adults who previously received the previous shingles vaccine (Zostavax )  3) The preferred vaccine " for helping to prevent shingles and its related complications  4) It is a series of TWO vaccines with the second dose administered between 2-6 months after the first dose in this series  5) PLEASE CHECK COST WITH YOUR INSURANCE COMPANY OR YOUR LOCAL PHARMACY AS EACH DOSE MAY BE AS MUCH AS APPROXIMATELY $300 IF NOT COVERED BY INSURANCE    Establish with Dr. Kang in the future - at least annually or as needed      Jackelyn Barros,   [facility]

## 2018-12-04 NOTE — MR AVS SNAPSHOT
After Visit Summary   12/4/2018    Ariadna Duran    MRN: [id number]           Patient Information     Date Of Birth          1950        Visit Information        Provider Department      12/4/2018 12:00 PM Jackelyn Barros,  [facility]        Today's Diagnoses     Medicare annual wellness visit, subsequent    -  1    Essential hypertension with goal blood pressure less than 140/90        Recurrent cold sores        Vitamin D deficiency        Recurrent deep vein thrombosis (DVT) (H)          Care Instructions    Labs today    Keep up the awesome work!    Shingrix  is:  1) Recommended for healthy adults aged 50 years and older to help prevent shingles and its related complications such as pain related to the shingles virus  2) Recommended for adults who previously received the previous shingles vaccine (Zostavax )  3) The preferred vaccine for helping to prevent shingles and its related complications  4) It is a series of TWO vaccines with the second dose administered between 2-6 months after the first dose in this series  5) PLEASE CHECK COST WITH YOUR INSURANCE COMPANY OR YOUR LOCAL PHARMACY AS EACH DOSE MAY BE AS MUCH AS APPROXIMATELY $300 IF NOT COVERED BY INSURANCE    Establish with Dr. Kang in the future - at least annually or as needed    Preventive Health Recommendations  See your health care provider every year to    Review health changes.     Discuss preventive care.      Review your medicines if your doctor has prescribed any.      You no longer need a yearly Pap test unless you've had an abnormal Pap test in the past 10 years. If you have vaginal symptoms, such as bleeding or discharge, be sure to talk with your provider about a Pap test.      Every 1 to 2 years, have a mammogram.  If you are over 69, talk with your health care provider about whether or not you want to continue having screening mammograms.      Every 10 years, have a colonoscopy. Or, have a yearly FIT  test (stool test). These exams will check for colon cancer.       Have a cholesterol test every 5 years, or more often if your doctor advises it.       Have a diabetes test (fasting glucose) every three years. If you are at risk for diabetes, you should have this test more often.       At age 65, have a bone density scan (DEXA) to check for osteoporosis (brittle bone disease).    Shots:    Get a flu shot each year.    Get a tetanus shot every 10 years.    Talk to your doctor about your pneumonia vaccines. There are now two you should receive - Pneumovax (PPSV 23) and Prevnar (PCV 13).    Talk to your pharmacist about the shingles vaccine.    Talk to your doctor about the hepatitis B vaccine.    Nutrition:     Eat at least 5 servings of fruits and vegetables each day.      Eat whole-grain bread, whole-wheat pasta and brown rice instead of white grains and rice.      Get adequate Calcium and Vitamin D.     Lifestyle    Exercise at least 150 minutes a week (30 minutes a day, 5 days a week). This will help you control your weight and prevent disease.      Limit alcohol to one drink per day.      No smoking.       Wear sunscreen to prevent skin cancer.       See your dentist twice a year for an exam and cleaning.      See your eye doctor every 1 to 2 years to screen for conditions such as glaucoma, macular degeneration and cataracts.    Personalized Prevention Plan  You are due for the preventive services outlined below.  Your care team is available to assist you in scheduling these services.  If you have already completed any of these items, please share that information with your care team to update in your medical record.  Health Maintenance Due   Topic Date Due     Bone Density Screening (Dexa)  08/24/2016     Pneumococcal Vaccine (1 of 2 - PCV13) 08/24/2016     Discuss Advance Directive Planning  03/23/2017     Flu Vaccine (1) 09/01/2018     Basic Metabolic Lab - yearly  12/01/2018     FALL RISK ASSESSMENT   "12/01/2018     Depression Assessment 2 - yearly  12/01/2018             Follow-ups after your visit        Follow-up notes from your care team     Return in about 1 year (around 12/4/2019) for Physical Exam.      Who to contact     If you have questions or need follow up information about today's clinic visit or your schedule please contact [facility] directly at [phone].  Normal or non-critical lab and imaging results will be communicated to you by Graceful Tableshart, letter or phone within 4 business days after the clinic has received the results. If you do not hear from us within 7 days, please contact the clinic through Graemattert or phone. If you have a critical or abnormal lab result, we will notify you by phone as soon as possible.  Submit refill requests through [employer] or call your pharmacy and they will forward the refill request to us. Please allow 3 business days for your refill to be completed.          Additional Information About Your Visit        Graceful [employer] Information     Cl[employer] gives you secure access to your electronic health record. If you see a primary care provider, you can also send messages to your care team and make appointments. If you have questions, please call your primary care clinic.  If you do not have a primary care provider, please call [phone] and they will assist you.        Care EveryWhere ID     This is your Care EveryWhere ID. This could be used by other organizations to access your [facility] medical records  [id number]        Your Vitals Were     Pulse Temperature Height Last Period Pulse Oximetry BMI (Body Mass Index)    71 97.5  F (36.4  C) (Oral) 5' 7\" (1.702 m) (LMP Unknown) 95% 34.14 kg/m2       Blood Pressure from Last 3 Encounters:   12/04/18 118/70   04/05/18 157/85   12/01/17 136/82    Weight from Last 3 Encounters:   12/04/18 218 lb (98.9 kg)   04/05/18 249 lb (112.9 kg)   12/01/17 260 lb (117.9 kg)              We Performed the Following     CBC with " platelets     Comprehensive metabolic panel     Lipid panel reflex to direct LDL Fasting     Vitamin D Deficiency          Where to get your medicines      These medications were sent to [facility] [id number] [place] [address] [address] AT [facility] & [address] [place], [place] [address] [address]     Phone:  [phone]     acyclovir 5 % external cream    lisinopril 10 MG tablet    rivaroxaban ANTICOAGULANT 20 MG Tabs tablet          Primary Care Provider Office Phone # Fax #    Jackelyn Barros, [phone] [address]  [facility] [address]        Equal Access to Services     [facility]setJOHN : Hadii aad ku hadasho Soomaali, waaxda luqadaha, qaybta kaalmada adeegyada, waxay idiin hayterrencen pawel levin . So [facility] [phone].    ATENCIÓN: Si habla español, tiene a yarbrough disposición servicios gratuitos de asistencia lingüística. Llame al [phone].    We comply with applicable federal civil rights laws and [place] laws. We do not discriminate on the basis of race, color, national origin, age, disability, sex, sexual orientation, or gender identity.            Thank you!     Thank you for choosing [facility]  for your care. Our goal is always to provide you with excellent care. Hearing back from our patients is one way we can continue to improve our services. Please take a few minutes to complete the written survey that you may receive in the mail after your visit with us. Thank you!             Your Updated Medication List - Protect others around you: Learn how to safely use, store and throw away your medicines at www.disposemymeds.org.          This list is accurate as of 12/4/18  1:15 PM.  Always use your most recent med list.                   Brand Name Dispense Instructions for use Diagnosis    acyclovir 5 % external cream    ZOVIRAX    5 g    Apply topically 5 times daily As needed for cold sore outbreaks    Recurrent cold sores        DHEA PO           lisinopril 10 MG tablet    PRINIVIL/ZESTRIL    90 tablet    TAKE 1 TABLET(10 MG) BY MOUTH DAILY    Essential hypertension with goal blood pressure less than 140/90       LYSINE PO           rivaroxaban ANTICOAGULANT 20 MG Tabs tablet    XARELTO    90 tablet    Take 1 tablet (20 mg) by mouth daily (with dinner)    Recurrent deep vein thrombosis (DVT) (H)       vitamin B complex with vitamin C tablet      Take 1 tablet by mouth daily        VITAMIN D (CHOLECALCIFEROL) PO      Take 3,000 Units by mouth daily

## 2018-12-05 LAB
ALBUMIN SERPL-MCNC: 3.6 G/DL (ref 3.4–5)
ALP SERPL-CCNC: 75 U/L (ref 40–150)
ALT SERPL W P-5'-P-CCNC: 17 U/L (ref 0–50)
ANION GAP SERPL CALCULATED.3IONS-SCNC: 7 MMOL/L (ref 3–14)
AST SERPL W P-5'-P-CCNC: 16 U/L (ref 0–45)
BILIRUB SERPL-MCNC: 0.3 MG/DL (ref 0.2–1.3)
BUN SERPL-MCNC: 15 MG/DL (ref 7–30)
CALCIUM SERPL-MCNC: 8.9 MG/DL (ref 8.5–10.1)
CHLORIDE SERPL-SCNC: 106 MMOL/L (ref 94–109)
CHOLEST SERPL-MCNC: 178 MG/DL
CO2 SERPL-SCNC: 25 MMOL/L (ref 20–32)
CREAT SERPL-MCNC: 0.71 MG/DL (ref 0.52–1.04)
DEPRECATED CALCIDIOL+CALCIFEROL SERPL-MC: 71 UG/L (ref 20–75)
GFR SERPL CREATININE-BSD FRML MDRD: 82 ML/MIN/1.7M2
GLUCOSE SERPL-MCNC: 96 MG/DL (ref 70–99)
HDLC SERPL-MCNC: 58 MG/DL
LDLC SERPL CALC-MCNC: 100 MG/DL
NONHDLC SERPL-MCNC: 120 MG/DL
POTASSIUM SERPL-SCNC: 4.4 MMOL/L (ref 3.4–5.3)
PROT SERPL-MCNC: 6.5 G/DL (ref 6.8–8.8)
SODIUM SERPL-SCNC: 138 MMOL/L (ref 133–144)
TRIGL SERPL-MCNC: 101 MG/DL

## 2018-12-06 LAB
COPATH REPORT: NORMAL
PAP: NORMAL

## 2018-12-07 LAB
FINAL DIAGNOSIS: NORMAL
HPV HR 12 DNA CVX QL NAA+PROBE: NEGATIVE
HPV16 DNA SPEC QL NAA+PROBE: NEGATIVE
HPV18 DNA SPEC QL NAA+PROBE: NEGATIVE
SPECIMEN DESCRIPTION: NORMAL
SPECIMEN SOURCE CVX/VAG CYTO: NORMAL

## 2018-12-31 PROBLEM — E66.811 CLASS 1 OBESITY WITHOUT SERIOUS COMORBIDITY WITH BODY MASS INDEX (BMI) OF 34.0 TO 34.9 IN ADULT: Chronic | Status: ACTIVE | Noted: 2017-12-17

## 2018-12-31 PROBLEM — E66.01 MORBID OBESITY (H): Chronic | Status: RESOLVED | Noted: 2017-12-17 | Resolved: 2018-12-31

## 2019-01-09 ENCOUNTER — TELEPHONE (OUTPATIENT)
Dept: LAB | Facility: CLINIC | Age: 69
End: 2019-01-09

## 2019-01-09 NOTE — TELEPHONE ENCOUNTER
Prior Authorization Retail Medication Request    Medication/Dose: Xarelto 20 mg  ICD code (if different than what is on RX):  I82.409  Previously Tried and Failed:  Enoxaparin, Warfarin  Rationale:  Recurrent DVT with history of Factor V Leiden (heterozygous)    [facility]  Insurance ID:  [id number]      Pharmacy Information (if different than what is on RX)  [facility]:  [id number]  Phone:  [phone]

## 2019-01-15 NOTE — TELEPHONE ENCOUNTER
PA not needed.  Pharmacy states if patient is to continue on medication they will need refills.

## 2019-01-15 NOTE — TELEPHONE ENCOUNTER
Central Prior Authorization Team   Phone: [phone]    PA Initiation    Medication: Xarelto 20 mg  Insurance Company: [employer] - Phone [phone] Fax [phone]  Pharmacy Filling the Rx: [facility] [address] [place] [address] [address] AT [facility] & [address] [place] [address]  Filling Pharmacy Phone: [phone]  Filling Pharmacy Fax: [phone]  Start Date: 1/15/2019

## 2019-01-15 NOTE — TELEPHONE ENCOUNTER
[facility] never received prescription for patient's Xarelto. Called in prescription that was written on 12/4/18 by Dr. Barros.    Don Melendez CMA on 1/15/2019 at 4:33 PM

## 2019-04-15 ENCOUNTER — TRANSFERRED RECORDS (OUTPATIENT)
Dept: HEALTH INFORMATION MANAGEMENT | Facility: CLINIC | Age: 69
End: 2019-04-15

## 2019-04-15 LAB
CREAT SERPL-MCNC: 0.67 MG/DL (ref 0.5–0.99)
GFR SERPL CREATININE-BSD FRML MDRD: 91 ML/MIN/1.73M2
GLUCOSE SERPL-MCNC: 75 MG/DL (ref 65–99)
POTASSIUM SERPL-SCNC: 3.9 MMOL/L (ref 3.5–5.3)
TSH SERPL-ACNC: 1.77 UIU/ML (ref 0.3–5)

## 2019-07-12 ENCOUNTER — OFFICE VISIT (OUTPATIENT)
Dept: FAMILY MEDICINE | Facility: CLINIC | Age: 69
End: 2019-07-12
Payer: COMMERCIAL

## 2019-07-12 ENCOUNTER — HOSPITAL ENCOUNTER (OUTPATIENT)
Dept: ULTRASOUND IMAGING | Facility: CLINIC | Age: 69
Discharge: HOME | End: 2019-07-12
Attending: NURSE PRACTITIONER | Admitting: NURSE PRACTITIONER
Payer: COMMERCIAL

## 2019-07-12 VITALS
TEMPERATURE: 98.8 F | HEIGHT: 67 IN | SYSTOLIC BLOOD PRESSURE: 111 MMHG | OXYGEN SATURATION: 95 % | HEART RATE: 72 BPM | WEIGHT: 224 LBS | DIASTOLIC BLOOD PRESSURE: 70 MMHG | BODY MASS INDEX: 35.16 KG/M2

## 2019-07-12 DIAGNOSIS — D68.51 FACTOR V LEIDEN (H): ICD-10-CM

## 2019-07-12 DIAGNOSIS — M79.662 PAIN OF LEFT CALF: ICD-10-CM

## 2019-07-12 DIAGNOSIS — Z86.718 PERSONAL HISTORY OF DVT (DEEP VEIN THROMBOSIS): Primary | ICD-10-CM

## 2019-07-12 DIAGNOSIS — Z86.718 PERSONAL HISTORY OF DVT (DEEP VEIN THROMBOSIS): ICD-10-CM

## 2019-07-12 PROCEDURE — 93971 EXTREMITY STUDY: CPT | Mod: LT

## 2019-07-12 PROCEDURE — 99214 OFFICE O/P EST MOD 30 MIN: CPT | Performed by: NURSE PRACTITIONER

## 2019-07-12 ASSESSMENT — MIFFLIN-ST. JEOR: SCORE: 1583.69

## 2019-07-12 NOTE — PROGRESS NOTES
Subjective     Ariadna Duran is a 68 year old female who presents to clinic today for the following health issues:    HPI     Chief Complaint   Patient presents with     Deep Vein Thrombosis     left leg, R/O     Hx 2 DVT   Injury 2.5 months ago where she hit the left medial knee. Developed a knot of the area  A couple weeks after developed symptoms of a DVT in calf and also got a discomfort of upper posterior leg   Today walking down stairs was very painful   Concerned about DVT currently on xarelto   States L leg is bigger than right   Had neg US 1 year ago   No other concerns today. No chest symptoms       Past Medical History:   Diagnosis Date     Dvt femoral (deep venous thrombosis) (H) 2010    x 2 (one in each leg) years apart      Factor 5 Leiden mutation, heterozygous (H)      GERD (gastroesophageal reflux disease) 2012     Hypertension goal BP (blood pressure) < 140/90      Obesity      Thyroid nodule     followed by endocrine     Family History   Problem Relation Age of Onset     Heart Disease Mother         Lived to age 96     Hypertension Mother      Breast Cancer Mother         lumpectomy ()     Asthma Mother      Thyroid Disease Mother         goiter removal     Dementia Mother      Neurologic Disorder Father         parkinsons;  at 86 yo with supranuclear palsy     Cancer Maternal Grandmother      Cerebrovascular Disease Maternal Grandfather      Hypertension Sister      Depression Daughter      Thyroid Disease Daughter         hypo thyroid     Obesity Daughter      Anxiety Disorder Daughter      Obesity Daughter      Genetic Disorder Niece         factor 5     Cystic Fibrosis Niece         Juana     Past Surgical History:   Procedure Laterality Date     C GASTROPLASTY,OBESITY,VERT BAND  2000, 2010    x 2 RuxenY the second time     CHOLECYSTECTOMY      roverto     COSMETIC BLEPHAROPLASTY LOWER LIDS BILATERAL  2012    Procedure: COSMETIC BLEPHAROPLASTY LOWER LIDS BILATERAL;   "BILATERAL UPPER LID PTOSIS REPAIR, BILATERAL LOWER LID COSMETIC BLEPHAROPLASTY ;  Surgeon: Simón Layne MD;  Location: [facility]     HERNIA REPAIR  2007    abdominal     REPAIR PTOSIS BILATERAL  2012    Procedure: REPAIR PTOSIS BILATERAL;;  Surgeon: Simón Layne MD;  Location: [facility]     VASCULAR SURGERY  2007;     blood clots LLE and RLE     Social History     Tobacco Use     Smoking status: Former Smoker     Packs/day: 0.50     Years: 3.00     Pack years: 1.50     Types: Cigarettes     Last attempt to quit: 1972     Years since quittin.5     Smokeless tobacco: Never Used     Tobacco comment:  Pt quit smoking  (5 years)   Substance Use Topics     Alcohol use: Yes     Alcohol/week: 0.0 oz     Comment: 2-3 x week     Current Outpatient Medications   Medication Sig Dispense Refill     lisinopril (PRINIVIL/ZESTRIL) 10 MG tablet TAKE 1 TABLET(10 MG) BY MOUTH DAILY 90 tablet 3     LYSINE PO        rivaroxaban ANTICOAGULANT (XARELTO) 20 MG TABS tablet Take 1 tablet (20 mg) by mouth daily (with dinner) 90 tablet 3     vitamin B complex with vitamin C (VITAMIN  B COMPLEX) TABS tablet Take 1 tablet by mouth daily       VITAMIN D, CHOLECALCIFEROL, PO Take 3,000 Units by mouth daily       Allergies   Allergen Reactions     No Known Allergies        Reviewed and updated as needed this visit by clinical staff and provider     Review of Systems   Detailed as above         Objective    /70 (BP Location: Right arm, Patient Position: Sitting, Cuff Size: Adult Large)   Pulse 72   Temp 98.8  F (37.1  C) (Oral)   Ht 1.702 m (5' 7\")   Wt 101.6 kg (224 lb)   LMP  (LMP Unknown)   SpO2 95%   BMI 35.08 kg/m    There is no height or weight on file to calculate BMI.  Physical Exam   Constitutional: She appears well-developed.   HENT:   Head: Normocephalic.   Eyes: Conjunctivae are normal.   Pulmonary/Chest: Effort normal.   Musculoskeletal: Normal range of motion. She exhibits no edema. "   Neurological: She is alert.   Skin: Skin is warm and dry. No erythema.   Psychiatric: She has a normal mood and affect. Judgment normal.            Assessment and Plan:       ICD-10-CM    1. Personal history of DVT (deep vein thrombosis) Z86.718 US Lower Extremity Venous Duplex Left   2. Factor V Leiden (H) D68.51 US Lower Extremity Venous Duplex Left   3. Pain of left calf M79.662 US Lower Extremity Venous Duplex Left     Will complete US today considering history, though unlikely DVT as she is anticoagulated currently. Suspect symptoms today are r/t injury. Follow-up with pcp with persistent symptoms     Left message for pt with unchanged US with chronic appearing clot.     Apolonia Nova, APRN, CNP  [facility] [employer]

## 2019-07-24 ENCOUNTER — OFFICE VISIT (OUTPATIENT)
Dept: FAMILY MEDICINE | Facility: CLINIC | Age: 69
End: 2019-07-24
Payer: COMMERCIAL

## 2019-07-24 VITALS
BODY MASS INDEX: 35.31 KG/M2 | DIASTOLIC BLOOD PRESSURE: 82 MMHG | HEIGHT: 67 IN | TEMPERATURE: 98.6 F | OXYGEN SATURATION: 96 % | WEIGHT: 225 LBS | HEART RATE: 69 BPM | SYSTOLIC BLOOD PRESSURE: 138 MMHG

## 2019-07-24 DIAGNOSIS — Z13.1 SCREENING FOR DIABETES MELLITUS: ICD-10-CM

## 2019-07-24 DIAGNOSIS — D68.51 FACTOR 5 LEIDEN MUTATION, HETEROZYGOUS (H): Chronic | ICD-10-CM

## 2019-07-24 DIAGNOSIS — I10 HYPERTENSION GOAL BP (BLOOD PRESSURE) < 140/90: Primary | Chronic | ICD-10-CM

## 2019-07-24 DIAGNOSIS — E66.01 MORBID OBESITY (H): ICD-10-CM

## 2019-07-24 DIAGNOSIS — I10 ESSENTIAL HYPERTENSION WITH GOAL BLOOD PRESSURE LESS THAN 140/90: Chronic | ICD-10-CM

## 2019-07-24 DIAGNOSIS — E04.1 THYROID NODULE: ICD-10-CM

## 2019-07-24 DIAGNOSIS — Z86.718 HISTORY OF DEEP VENOUS THROMBOSIS: Chronic | ICD-10-CM

## 2019-07-24 DIAGNOSIS — M79.662 PAIN OF LEFT LOWER LEG: ICD-10-CM

## 2019-07-24 DIAGNOSIS — I82.409 RECURRENT DEEP VEIN THROMBOSIS (DVT) (H): ICD-10-CM

## 2019-07-24 DIAGNOSIS — E78.5 DYSLIPIDEMIA: ICD-10-CM

## 2019-07-24 PROCEDURE — G0009 ADMIN PNEUMOCOCCAL VACCINE: HCPCS | Performed by: INTERNAL MEDICINE

## 2019-07-24 PROCEDURE — 99215 OFFICE O/P EST HI 40 MIN: CPT | Mod: 25 | Performed by: INTERNAL MEDICINE

## 2019-07-24 PROCEDURE — 90670 PCV13 VACCINE IM: CPT | Performed by: INTERNAL MEDICINE

## 2019-07-24 RX ORDER — OMEGA-3 FATTY ACIDS/FISH OIL 300-1000MG
CAPSULE ORAL
COMMUNITY

## 2019-07-24 RX ORDER — LISINOPRIL 10 MG/1
TABLET ORAL
Qty: 90 TABLET | Refills: 3 | COMMUNITY
Start: 2019-07-24 | End: 2020-01-23

## 2019-07-24 ASSESSMENT — MIFFLIN-ST. JEOR: SCORE: 1588.22

## 2019-07-24 NOTE — PROGRESS NOTES
Subjective     Ariadna Duran is a 68 year old female who presents to clinic today for the following health issues:    HPI   New Patient/Transfer of Care  Chief Complaint   Patient presents with     New Patient     establish care      Edema     pt states that she has been experiencing edema and pain in left leg for 2 mo      Patient presenting to establish care and discuss issues with left leg pain on the calf area, she had sustained a fall last May when she was overstepping an elevated top and she slipped and hit the medial aspect of her left knee and sustained a big bruise over the medial aspect of her knee, and currently still complaining of pain and feeling of a knot also on the posterior calf area, affecting her walking at times.  Hurts with dorsiflexion of her left foot.  Feels her left leg is more swollen than the right leg, she was having difficulty with ambulation after the injury but now has been able to flex her leg more.  The pain waxes and wanes.   She denies any chest pain or pleuritic chest pain or difficulty breathing or discomfort with breathing or dizziness or lightheadedness.   no GI symptoms; no diarrhea or constipation; she had a colonoscopy that is up-to-date is due for repeat in 10 years ,  she is due for her mammogram last was in January 2018.    She states her Pap smears were all normal in the past.   she follows with hematology for history of chronic DVT has last ultrasound did not show any acute DVT or occlusive blood clot.  Doppler ultrasound of left lower extremity was in 12/2019.    Hypertension seems well controlled on lisinopril 10 mg once daily.    She sees eye doctor regularly.  Hearing is ok,   Mild right lower back pain      Patient Active Problem List   Diagnosis     History of deep venous thrombosis     Intestinal Malabsorption - s/p gastric bipass     Vitamin D deficiency     Essential hypertension with goal blood pressure less than 140/90     GERD (gastroesophageal reflux  disease)     Recurrent cold sores     Factor 5 Leiden mutation, heterozygous (H)     Class 1 obesity without serious comorbidity with body mass index (BMI) of 34.0 to 34.9 in adult     Screening for cervical cancer     Thyroid nodule     Obesity (BMI 35.0-39.9) with comorbidity (H)     Obesity     Recurrent deep vein thrombosis (DVT) (H)     Dyslipidemia     Pain of left lower leg     Past Surgical History:   Procedure Laterality Date     C GASTROPLASTY,OBESITY,VERT BAND  2000, 2010    x 2 RuxenY the second time     CHOLECYSTECTOMY      roverto     COSMETIC BLEPHAROPLASTY LOWER LIDS BILATERAL  2012    Procedure: COSMETIC BLEPHAROPLASTY LOWER LIDS BILATERAL;  BILATERAL UPPER LID PTOSIS REPAIR, BILATERAL LOWER LID COSMETIC BLEPHAROPLASTY ;  Surgeon: Simón Layne MD;  Location: [facility]     HERNIA REPAIR  2007    abdominal     REPAIR PTOSIS BILATERAL  2012    Procedure: REPAIR PTOSIS BILATERAL;;  Surgeon: Simón Layne MD;  Location: [facility]       Social History     Tobacco Use     Smoking status: Former Smoker     Packs/day: 0.50     Years: 3.00     Pack years: 1.50     Types: Cigarettes     Last attempt to quit: 1972     Years since quittin.6     Smokeless tobacco: Never Used     Tobacco comment:  Pt quit smoking  (5 years)   Substance Use Topics     Alcohol use: Yes     Alcohol/week: 0.0 oz     Comment:  WINE 1-2 X PER WEEK     Family History   Problem Relation Age of Onset     Heart Disease Mother         Lived to age 96     Hypertension Mother      Breast Cancer Mother         lumpectomy ()     Asthma Mother      Thyroid Disease Mother         goiter removal     Dementia Mother      Neurologic Disorder Father         parkinsons;  at 86 yo with supranuclear palsy     Cancer Maternal Grandmother      Cerebrovascular Disease Maternal Grandfather      Hypertension Sister      Depression Daughter      Thyroid Disease Daughter         hypo thyroid     Obesity Daughter   "    Anxiety Disorder Daughter      Obesity Daughter      Genetic Disorder Niece         factor 5     Cystic Fibrosis Niece         Juana         Current Outpatient Medications   Medication Sig Dispense Refill     diclofenac (VOLTAREN) 1 % topical gel Place 1 g onto the skin 3 times daily 100 g 0     lisinopril (PRINIVIL/ZESTRIL) 10 MG tablet TAKE 1.5 TABLET(10 MG) BY MOUTH DAILY 90 tablet 3     LYSINE PO        omega 3 1000 MG CAPS        rivaroxaban ANTICOAGULANT (XARELTO) 20 MG TABS tablet Take 1 tablet (20 mg) by mouth daily (with dinner) 90 tablet 3     vitamin B complex with vitamin C (VITAMIN  B COMPLEX) TABS tablet Take 1 tablet by mouth daily       VITAMIN D, CHOLECALCIFEROL, PO Take 3,000 Units by mouth daily         Allergies   Allergen Reactions     No Known Allergies        BP Readings from Last 3 Encounters:   07/24/19 138/82   07/12/19 111/70   12/04/18 118/70    Wt Readings from Last 3 Encounters:   07/24/19 102.1 kg (225 lb)   07/12/19 101.6 kg (224 lb)   12/04/18 98.9 kg (218 lb)             Reviewed and updated as needed this visit by Provider  Tobacco  Meds  Problems  Med Hx  Surg Hx  Fam Hx  Soc Hx        Review of Systems   ROS COMP: Constitutional, HEENT, cardiovascular, pulmonary, GI, , musculoskeletal, neuro, skin, endocrine and psych systems are negative, except as otherwise noted.      Objective    /82 (BP Location: Right arm, Patient Position: Sitting, Cuff Size: Adult Regular)   Pulse 69   Temp 98.6  F (37  C) (Oral)   Ht 1.702 m (5' 7\")   Wt 102.1 kg (225 lb)   LMP  (LMP Unknown)   SpO2 96%   Breastfeeding? No   BMI 35.24 kg/m    Body mass index is 35.24 kg/m .  Physical Exam   GENERAL: healthy, alert and no distress  EYES: Eyes grossly normal to inspection, PERRL and conjunctivae and sclerae normal  NECK: no adenopathy, no asymmetry, masses, or scars and thyroid normal to palpation  RESP: lungs clear to auscultation - no rales, rhonchi or wheezes  CV: regular rate " and rhythm, normal S1 S2, no S3 or S4, no murmur, click or rub, no peripheral edema and peripheral pulses strong  ABDOMEN: soft, nontender, no hepatosplenomegaly, no masses and bowel sounds normal  ABDOMEN: soft, nontender, without hepatosplenomegaly or masses and obese  MS: no gross musculoskeletal defects noted, no edema  SKIN: no suspicious lesions or rashes and there is warts like lesion on left knee.  NEURO: Normal strength and tone, mentation intact and speech normal  PSYCH: mentation appears normal, affect normal/bright    Diagnostic Test Results:  Labs reviewed in Epic        Assessment & Plan     Ariadna was seen today for new patient and edema.    Diagnoses and all orders for this visit:    Hypertension goal BP (blood pressure) < 140/90  -     Comprehensive metabolic panel; Future  -     Albumin Random Urine Quantitative with Creat Ratio; Future    Thyroid nodule    Morbid obesity (H)    Dyslipidemia  -     Lipid panel reflex to direct LDL Fasting; Future    Screening for diabetes mellitus    Pain of left lower leg  -     diclofenac (VOLTAREN) 1 % topical gel; Place 1 g onto the skin 3 times daily  -     PHYSICAL THERAPY REFERRAL; Future    Essential hypertension with goal blood pressure less than 140/90  Comments:  continue current meds    Recurrent deep vein thrombosis (DVT) (H)    Factor 5 Leiden mutation, heterozygous (H)    History of deep venous thrombosis    Other orders  -     PNEUMOCOCCAL CONJ VACCINE 13 VALENT IM      Will refer patient to physical therapy for some strengthening stretching exercise of her left lower extremity muscles.  Possible underlying osteoarthropathy of the knee vs meniscal injury , possible calf muscle sprain with secondary hematoma formation and calcification causing the pain and hard knot she feels, and affecting her ambulation. the pain waxes and wanes, we will try diclofenac topical, advised can increase risk of bleeding although systemic absorption is minimal , to apply  "minimal amount 1 g ,3 times a day instead of 4 times a day.  Apply warm compresses on the calf muscles and Biofreeze gel up to 3 times a day.     Blood pressure slightly uncontrolled, systolic was 135 mm Hg advised to increase the lisinopril to 1-1/2 tablet [I.e. 15 mg instead of 10 mg], continue with lifestyle changes walking low-fat low-cholesterol diet, low salt intake.    The 10-year atherosclerotic cardiovascular disease is 9.5% recommendation to be on moderate dose of statin patient declines statins at this time.      Total time 11:20 , till 12:11 AM     Time spent face-to-face was 45 minutes [from 11:20 AM till 12:11 AM] with more than 50% counseling, review of records and addressing multiple health problems as listed in Assessment Plan.      BMI:   Estimated body mass index is 35.08 kg/m  as calculated from the following:    Height as of 7/12/19: 1.702 m (5' 7\").    Weight as of 7/12/19: 101.6 kg (224 lb).   Weight management plan: Discussed healthy diet and exercise guidelines        MEDICATIONS:        - Increase lisinopril to 15 mg daily. Call Dr with BP readings at leats x 3  Work on weight loss  Regular exercise  See Patient Instructions    Return in about 2 months (around 9/24/2019) for BP Recheck, Routine Visit, left leg pain,.    Alonso Dickinson MD  [facility]        "

## 2019-07-28 PROBLEM — I82.409 RECURRENT DEEP VEIN THROMBOSIS (DVT) (H): Status: ACTIVE | Noted: 2019-07-28

## 2019-07-28 PROBLEM — E78.5 DYSLIPIDEMIA: Status: ACTIVE | Noted: 2019-07-28

## 2019-07-28 PROBLEM — M79.662 PAIN OF LEFT LOWER LEG: Status: ACTIVE | Noted: 2019-07-28

## 2019-08-05 ENCOUNTER — THERAPY VISIT (OUTPATIENT)
Dept: PHYSICAL THERAPY | Facility: CLINIC | Age: 69
End: 2019-08-05
Payer: COMMERCIAL

## 2019-08-05 DIAGNOSIS — M25.562 ACUTE PAIN OF LEFT KNEE: ICD-10-CM

## 2019-08-05 DIAGNOSIS — M79.662 PAIN OF LEFT LOWER LEG: ICD-10-CM

## 2019-08-05 PROCEDURE — 97110 THERAPEUTIC EXERCISES: CPT | Mod: GP | Performed by: PHYSICAL THERAPIST

## 2019-08-05 PROCEDURE — 97161 PT EVAL LOW COMPLEX 20 MIN: CPT | Mod: GP | Performed by: PHYSICAL THERAPIST

## 2019-08-05 ASSESSMENT — ACTIVITIES OF DAILY LIVING (ADL)
SWELLING: THE SYMPTOM AFFECTS MY ACTIVITY SLIGHTLY
SQUAT: ACTIVITY IS SOMEWHAT DIFFICULT
PAIN: THE SYMPTOM AFFECTS MY ACTIVITY SLIGHTLY
STAND: ACTIVITY IS SOMEWHAT DIFFICULT
HOW_WOULD_YOU_RATE_THE_OVERALL_FUNCTION_OF_YOUR_KNEE_DURING_YOUR_USUAL_DAILY_ACTIVITIES?: NORMAL
STIFFNESS: THE SYMPTOM AFFECTS MY ACTIVITY SLIGHTLY
LIMPING: THE SYMPTOM AFFECTS MY ACTIVITY SLIGHTLY
KNEE_ACTIVITY_OF_DAILY_LIVING_SUM: 42
WALK: ACTIVITY IS SOMEWHAT DIFFICULT
AS_A_RESULT_OF_YOUR_KNEE_INJURY,_HOW_WOULD_YOU_RATE_YOUR_CURRENT_LEVEL_OF_DAILY_ACTIVITY?: NORMAL
RISE FROM A CHAIR: ACTIVITY IS SOMEWHAT DIFFICULT
GIVING WAY, BUCKLING OR SHIFTING OF KNEE: THE SYMPTOM AFFECTS MY ACTIVITY SLIGHTLY
GO DOWN STAIRS: ACTIVITY IS SOMEWHAT DIFFICULT
WEAKNESS: THE SYMPTOM AFFECTS MY ACTIVITY SLIGHTLY
GO UP STAIRS: ACTIVITY IS SOMEWHAT DIFFICULT
RAW_SCORE: 42
SIT WITH YOUR KNEE BENT: ACTIVITY IS SOMEWHAT DIFFICULT
KNEE_ACTIVITY_OF_DAILY_LIVING_SCORE: 60
KNEEL ON THE FRONT OF YOUR KNEE: ACTIVITY IS SOMEWHAT DIFFICULT

## 2019-08-05 NOTE — PROGRESS NOTES
[facility] Initial Evaluation  Subjective:  The history is provided by the patient. No  was used.   Type of problem:  Left knee       Problem details: Pt was getting into her new bath tub when she slipped on her L leg, she had sudden onset of posterior knee pain and bruising over the medial aspect of the L knee, few days later she started to have severe calf pain, was tested for DVT which was negative, MD recommended PT  .   Site of Pain: none    Symptoms are exacerbated by bending/squatting       and reported as 0/10 on pain scale. General health as reported by patient is good. Pertinent medical history includes:  High blood pressure.      Current medications:  High blood pressure medication.     Pain quality: none          Patient is retired .                           Objective:  System                                           Hip Evaluation    Hip Strength:    Flexion:   Left: 4/5   Pain:  Right: 4/5   Pain:                    Extension:  Left: 4/5  Pain:Right: 4/5    Pain:    Abduction:  Left: 5/5     Pain:Right: 5/5    Pain:      External Rotation:  Left: 5/5   Pain:  Right: 5/5   Pain:                     Knee Evaluation:  ROM:  AROM: normal  PROM: normal            Strength:     Extension:  Left: 5/5   Pain:      Right: 5/5   Pain:  Flexion:  Left: 4/5   Pain:      Right: 4/5   Pain:    Quad Set Left: WNL    Pain:   Quad Set Right: WNL    Pain:      Palpation:    Left knee tenderness present at:  Medial Joint Line; Lateral Joint Line; Biceps Femoral; Semitendinosus and Semembranosus  Right knee tenderness present at:  Medial Joint Line; Lateral Joint Line; Biceps Femoral and Semitendinosus      Functional Testing:  : SLS 5 L, 10 sec R.        Quad:    Single Leg Squat:  Left:       Right:        Bilateral Leg Squat:  No pain   Mild loss of control and excessive anterior knee excursion              General     ROS    Assessment/Plan:    Patient is a 68 year old female  with left side knee complaints.    Patient has the following significant findings with corresponding treatment plan.                Diagnosis 1:  L knee weakness  Decreased strength - therapeutic exercise and home program  Impaired balance - neuro re-education    Therapy Evaluation Codes:   1) History comprised of:   Personal factors that impact the plan of care:      None.    Comorbidity factors that impact the plan of care are:      None.     Medications impacting care: High blood pressure.  2) Examination of Body Systems comprised of:   Body structures and functions that impact the plan of care:      Knee.   Activity limitations that impact the plan of care are:      Squatting/kneeling.  3) Clinical presentation characteristics are:   Stable/Uncomplicated.  4) Decision-Making    Low complexity using standardized patient assessment instrument and/or measureable assessment of functional outcome.  Cumulative Therapy Evaluation is: Low complexity.    Previous and current functional limitations:  (See Goal Flow Sheet for this information)    Short term and Long term goals: (See Goal Flow Sheet for this information)     Communication ability:  Patient appears to be able to clearly communicate and understand verbal and written communication and follow directions correctly.  Treatment Explanation - The following has been discussed with the patient:   RX ordered/plan of care  Anticipated outcomes  Possible risks and side effects  This patient would benefit from PT intervention to resume normal activities.   Rehab potential is excellent.    Frequency:  1 X week, once daily  Duration:  for 1 weeks  Discharge Plan:  Achieve all LTG.  Independent in home treatment program.  Return to previous functional level by discharge.  Reach maximal therapeutic benefit.  One rime visit only - will discontinue. Pt to continue home program     Please refer to the daily flowsheet for treatment today, total treatment time and time spent  performing 1:1 timed codes.

## 2019-08-06 NOTE — PROGRESS NOTES
[facility] Initial Evaluation  Subjective:       Pertinent medical history includes:  High blood pressure and overweight (Calf pain).  Medical allergies: none.  Surgeries include:  Other (Gastric bypass 2008).  Current medications:  High blood pressure medication (xarelto).   Primary job tasks include:  Computer work and repetitive tasks (Housework dog walking).           Occupation: Retired.                           Objective:  System    Physical Exam    General     ROS    Assessment/Plan:

## 2019-10-04 ENCOUNTER — HEALTH MAINTENANCE LETTER (OUTPATIENT)
Age: 69
End: 2019-10-04

## 2019-10-08 DIAGNOSIS — E78.5 DYSLIPIDEMIA: ICD-10-CM

## 2019-10-08 DIAGNOSIS — I10 HYPERTENSION GOAL BP (BLOOD PRESSURE) < 140/90: Chronic | ICD-10-CM

## 2019-10-08 LAB
ALBUMIN SERPL-MCNC: 4 G/DL (ref 3.4–5)
ALP SERPL-CCNC: 90 U/L (ref 40–150)
ALT SERPL W P-5'-P-CCNC: 22 U/L (ref 0–50)
ANION GAP SERPL CALCULATED.3IONS-SCNC: 8 MMOL/L (ref 3–14)
AST SERPL W P-5'-P-CCNC: 16 U/L (ref 0–45)
BILIRUB SERPL-MCNC: 0.3 MG/DL (ref 0.2–1.3)
BUN SERPL-MCNC: 13 MG/DL (ref 7–30)
CALCIUM SERPL-MCNC: 8.6 MG/DL (ref 8.5–10.1)
CHLORIDE SERPL-SCNC: 109 MMOL/L (ref 94–109)
CHOLEST SERPL-MCNC: 155 MG/DL
CO2 SERPL-SCNC: 22 MMOL/L (ref 20–32)
CREAT SERPL-MCNC: 0.65 MG/DL (ref 0.52–1.04)
CREAT UR-MCNC: 47 MG/DL
GFR SERPL CREATININE-BSD FRML MDRD: >90 ML/MIN/{1.73_M2}
GLUCOSE SERPL-MCNC: 90 MG/DL (ref 70–99)
HDLC SERPL-MCNC: 62 MG/DL
LDLC SERPL CALC-MCNC: 68 MG/DL
MICROALBUMIN UR-MCNC: 6 MG/L
MICROALBUMIN/CREAT UR: 12.75 MG/G CR (ref 0–25)
NONHDLC SERPL-MCNC: 93 MG/DL
POTASSIUM SERPL-SCNC: 4.5 MMOL/L (ref 3.4–5.3)
PROT SERPL-MCNC: 6.6 G/DL (ref 6.8–8.8)
SODIUM SERPL-SCNC: 138 MMOL/L (ref 133–144)
TRIGL SERPL-MCNC: 127 MG/DL

## 2019-10-08 PROCEDURE — 82043 UR ALBUMIN QUANTITATIVE: CPT | Performed by: INTERNAL MEDICINE

## 2019-10-08 PROCEDURE — 36415 COLL VENOUS BLD VENIPUNCTURE: CPT | Performed by: INTERNAL MEDICINE

## 2019-10-08 PROCEDURE — 80061 LIPID PANEL: CPT | Performed by: INTERNAL MEDICINE

## 2019-10-08 PROCEDURE — 80053 COMPREHEN METABOLIC PANEL: CPT | Performed by: INTERNAL MEDICINE

## 2019-10-16 ENCOUNTER — TELEPHONE (OUTPATIENT)
Dept: FAMILY MEDICINE | Facility: CLINIC | Age: 69
End: 2019-10-16

## 2020-02-08 ENCOUNTER — HEALTH MAINTENANCE LETTER (OUTPATIENT)
Age: 70
End: 2020-02-08

## 2020-02-17 DIAGNOSIS — I10 ESSENTIAL HYPERTENSION WITH GOAL BLOOD PRESSURE LESS THAN 140/90: Chronic | ICD-10-CM

## 2020-02-17 NOTE — TELEPHONE ENCOUNTER
"lisinopril (ZESTRIL) 10 MG tablet    Last Written Prescription Date:  1/24/2020  Last Fill Quantity: 45,  # refills: 0   Last office visit: 7/24/2019 with prescribing provider:  Dr. Dickinson   Future Office Visit:  unknown    Requested Prescriptions   Pending Prescriptions Disp Refills     lisinopril (ZESTRIL) 10 MG tablet 45 tablet 0     Sig: Take 1.5 tablets (15 mg) by mouth daily       ACE Inhibitors (Including Combos) Protocol Passed - 2/17/2020 11:34 AM        Passed - Blood pressure under 140/90 in past 12 months     BP Readings from Last 3 Encounters:   07/24/19 138/82   07/12/19 111/70   12/04/18 118/70                 Passed - Recent (12 mo) or future (30 days) visit within the authorizing provider's specialty     Patient has had an office visit with the authorizing provider or a provider within the authorizing providers department within the previous 12 mos or has a future within next 30 days. See \"Patient Info\" tab in inbasket, or \"Choose Columns\" in Meds & Orders section of the refill encounter.              Passed - Medication is active on med list        Passed - Patient is age 18 or older        Passed - No active pregnancy on record        Passed - Normal serum creatinine on file in past 12 months     Recent Labs   Lab Test 10/08/19  1044   CR 0.65             Passed - Normal serum potassium on file in past 12 months     Recent Labs   Lab Test 10/08/19  1044   POTASSIUM 4.5             Passed - No positive pregnancy test within past 12 months          "

## 2020-02-19 NOTE — TELEPHONE ENCOUNTER
TC's: Please contact Pt to assist with sheduling f/u visit. Given william x1 and did not followup     Please route back to refills once scheduled    Thank you,   Mary MARCELO RN

## 2020-02-20 RX ORDER — LISINOPRIL 10 MG/1
15 TABLET ORAL DAILY
Qty: 15 TABLET | Refills: 0 | Status: SHIPPED | OUTPATIENT
Start: 2020-02-20 | End: 2020-05-20 | Stop reason: DRUGHIGH

## 2020-02-20 NOTE — TELEPHONE ENCOUNTER
[employer] message sent to patient advising due for OV too    Routing refill request to provider for review/approval because:  Nadia given x1 and patient did not follow up, please advise    Erlinda ISLAS RN

## 2020-04-13 DIAGNOSIS — I82.409 RECURRENT DEEP VEIN THROMBOSIS (DVT) (H): ICD-10-CM

## 2020-04-13 NOTE — TELEPHONE ENCOUNTER
Requested Prescriptions   Pending Prescriptions Disp Refills     rivaroxaban ANTICOAGULANT (XARELTO ANTICOAGULANT) 20 MG TABS tablet 90 tablet 0     Sig: Take 1 tablet (20 mg) by mouth daily (with dinner)  Last Written Prescription Date:  10/16/19  Last Fill Quantity: 90 tab,  # refills: 0   Last office visit: 7/24/2019 with prescribing provider:  Teena   Future Office Visit:         Direct Oral Anticoagulant Agents Failed - 4/13/2020 11:24 AM        Failed - Normal Platelets on file in past 12 months     Recent Labs   Lab Test 12/04/18  1328                  Failed - Creatinine Clearance greater than 50 ml/min on file in past 3 mos     No lab results found.          Failed - Serum creatinine less than or equal to 1.4 on file in past 3 mos     Recent Labs   Lab Test 10/08/19  1044   CR 0.65       Ok to refill medication if creatinine is low          Failed - Recent (6 mo) or future (30 days) visit within the authorizing provider's specialty        Passed - Medication is active on med list        Passed - Patient is 18 years of age or older        Passed - No active pregnancy on record        Passed - No positive pregnancy test within past 12 months

## 2020-04-14 ENCOUNTER — TELEPHONE (OUTPATIENT)
Dept: FAMILY MEDICINE | Facility: CLINIC | Age: 70
End: 2020-04-14

## 2020-04-14 NOTE — TELEPHONE ENCOUNTER
Routing refill request to provider for review/approval because:  Labs not current:  PLT, Cr  Patient needs to be seen because:  Patient has not been seen within the last 6 months.    MELINA EsquedaN, RN  [employer] [facility]

## 2020-04-16 ENCOUNTER — TELEPHONE (OUTPATIENT)
Dept: FAMILY MEDICINE | Facility: CLINIC | Age: 70
End: 2020-04-16

## 2020-04-16 NOTE — TELEPHONE ENCOUNTER
Reason for Call:  Other returning call    Detailed comments: patient called back,I relayed message below to scheduled  A telephone visit.  Patient said  She would consider it, and call back later if she decides to do so.     Phone Number Patient can be reached at: Cell number on file:    Telephone Information:   Mobile [phone]       Best Time:     Can we leave a detailed message on this number? Not Applicable    Call taken on 4/16/2020 at 9:26 AM by Darcy Camargo

## 2020-04-16 NOTE — TELEPHONE ENCOUNTER
Please notify patient  I recommend she schedules a follow up telephone visit with hematology due to being on chronic anticoagulation. We may need to put a new referral if have not seen them for many years, .

## 2020-04-16 NOTE — TELEPHONE ENCOUNTER
Looks like pt is planning on switching providers. Did message Dr. Cameron to see if he is able to do phone visit with pt if she's agreeable.

## 2020-04-16 NOTE — TELEPHONE ENCOUNTER
Reason for Call:  Other returning call    Detailed comments: patient returning call and said she will  Consider telephone visit and call back.     Call taken on 4/16/2020 at 9:24 AM by Darcy Camargo

## 2020-04-16 NOTE — TELEPHONE ENCOUNTER
Should we start with the phone visit with you first? I can call and explain to pt the rationale if you would like?  Lorena Adair RN

## 2020-04-17 NOTE — TELEPHONE ENCOUNTER
Left message for pt to call back to triage/clinic.  Will advise of Dr Dickinson's advise to follow up with hematology.  Also did check with Dr. Cameron, as pt will be out of medicaiton before [facility] care visit scheduled in June, and Dr. Cameron would be agreeable to a telephone visit to establish care as well.    Lorena Adair RN

## 2020-04-22 NOTE — TELEPHONE ENCOUNTER
Spoke with Pt:     Relayed recommendations for f/u with Hematology- she is agreeable and will contact her hem/onc provider (will reach out to us if she needs referral )     Scheduled establish care appt with Dr. Cameron for next month    Mary MARCELO RN

## 2020-05-20 ENCOUNTER — VIRTUAL VISIT (OUTPATIENT)
Dept: FAMILY MEDICINE | Facility: CLINIC | Age: 70
End: 2020-05-20
Payer: COMMERCIAL

## 2020-05-20 DIAGNOSIS — I82.409 RECURRENT DEEP VEIN THROMBOSIS (DVT) (H): ICD-10-CM

## 2020-05-20 DIAGNOSIS — I10 ESSENTIAL HYPERTENSION WITH GOAL BLOOD PRESSURE LESS THAN 140/90: Chronic | ICD-10-CM

## 2020-05-20 PROCEDURE — 99214 OFFICE O/P EST MOD 30 MIN: CPT | Mod: 95 | Performed by: INTERNAL MEDICINE

## 2020-05-20 RX ORDER — PEPPERMINT OIL
OIL (ML) MISCELLANEOUS
COMMUNITY
End: 2021-01-12

## 2020-05-20 RX ORDER — LISINOPRIL 20 MG/1
20 TABLET ORAL DAILY
Qty: 90 TABLET | Refills: 1 | Status: SHIPPED | OUTPATIENT
Start: 2020-05-20 | End: 2020-11-19

## 2020-05-20 NOTE — PROGRESS NOTES
"Ariadna Duran is a 69 year old female who is being evaluated via a billable telephone visit.      The patient has been notified of following:     \"This telephone visit will be conducted via a call between you and your physician/provider. We have found that certain health care needs can be provided without the need for a physical exam.  This service lets us provide the care you need with a short phone conversation.  If a prescription is necessary we can send it directly to your pharmacy.  If lab work is needed we can place an order for that and you can then stop by our lab to have the test done at a later time.    Telephone visits are billed at different rates depending on your insurance coverage. During this emergency period, for some insurers they may be billed the same as an in-person visit.  Please reach out to your insurance provider with any questions.    If during the course of the call the physician/provider feels a telephone visit is not appropriate, you will not be charged for this service.\"    Patient has given verbal consent for Telephone visit?  Yes    What phone number would you like to be contacted at? [phone]    How would you like to obtain your AVS? Mail a copy    Subjective     Ariadna Duran is a 69 year old female who presents via phone visit today for the following health issues:    HPI      Patient is needing a refill of her medications.  She is on lisinopril for hypertension and Xarelto for recurrent DVT.  She feels well and did not present any subjective complaints.  No overt bleeding episodes observed.      Review of Systems   Constitutional: Negative for fatigue.   Eyes: Negative for visual disturbance.   Respiratory: Negative for shortness of breath.    Cardiovascular: Negative for chest pain, palpitations and leg swelling.   Gastrointestinal: Negative for abdominal pain, nausea and vomiting.   Neurological: Negative for dizziness, weakness, light-headedness, numbness and headaches. "         ICD-10-CM    1. Essential hypertension with goal blood pressure less than 140/90  I10 lisinopril (ZESTRIL) 20 MG tablet    continue current meds   2. Recurrent deep vein thrombosis (DVT) (H)  I82.409 rivaroxaban ANTICOAGULANT (XARELTO ANTICOAGULANT) 20 MG TABS tablet       Total time spent with the patient over the phone was 10 minutes.      Dr. Chencho Cameron

## 2020-05-21 ASSESSMENT — ENCOUNTER SYMPTOMS
VOMITING: 0
FATIGUE: 0
PALPITATIONS: 0
WEAKNESS: 0
HEADACHES: 0
ABDOMINAL PAIN: 0
SHORTNESS OF BREATH: 0
DIZZINESS: 0
NAUSEA: 0
NUMBNESS: 0
LIGHT-HEADEDNESS: 0

## 2020-11-08 ENCOUNTER — HEALTH MAINTENANCE LETTER (OUTPATIENT)
Age: 70
End: 2020-11-08

## 2020-11-09 DIAGNOSIS — I82.409 RECURRENT DEEP VEIN THROMBOSIS (DVT) (H): ICD-10-CM

## 2020-11-10 NOTE — TELEPHONE ENCOUNTER
Routing refill request to provider for review/approval because:  Labs not current:  CBC  Pushpa HUNTER RN

## 2020-11-10 NOTE — TELEPHONE ENCOUNTER
Follow up recommended, also did patient see hematology in past, I recommend for history of recurrent DVT.

## 2020-11-10 NOTE — TELEPHONE ENCOUNTER
Left non detailed VM for pt asking that they callback and schedule f/u with PCP  Pushpa HUNTER RN

## 2020-11-19 ENCOUNTER — TELEPHONE (OUTPATIENT)
Dept: FAMILY MEDICINE | Facility: CLINIC | Age: 70
End: 2020-11-19

## 2020-11-19 DIAGNOSIS — I10 ESSENTIAL HYPERTENSION WITH GOAL BLOOD PRESSURE LESS THAN 140/90: Chronic | ICD-10-CM

## 2020-11-19 RX ORDER — LISINOPRIL 20 MG/1
20 TABLET ORAL DAILY
Qty: 30 TABLET | Refills: 0 | Status: SHIPPED | OUTPATIENT
Start: 2020-11-19 | End: 2020-12-02

## 2020-11-19 NOTE — TELEPHONE ENCOUNTER
Called and informed patient that Rx Rivaroxaban was E-Rx'd to patient's pharmacy 11- and should be available.      E-Rx'd to patient's pharmacy the Rx Lisinopril now.      Confirmed upcoming appointment with Dr Lynn 12-2-2020.      Shoshana BOUCHER RN,BSN

## 2020-11-19 NOTE — TELEPHONE ENCOUNTER
Reason for Call:  Medication or medication refill:    Do you use a [facility]?  Name of the pharmacy and phone number for the current request:     [facility]/PHARMACY #[id number] - [place], [place] - [address]    Name of the medication requested:   rivaroxaban ANTICOAGULANT (XARELTO ANTICOAGULANT)  lisinopril (ZESTRIL) 20 MG tablet [[id number]]    Other request: Pt stated that she is running out of her meds and would like a refill to last her until her appt on 12/02/2020 with Dr. Lynn.     Can we leave a detailed message on this number? YES    Phone number patient can be reached at: Cell number on file:    Telephone Information:   Mobile [phone]       Best Time: anytime    Call taken on 11/19/2020 at 10:09 AM by Lidia Roberson

## 2020-12-02 ENCOUNTER — OFFICE VISIT (OUTPATIENT)
Dept: FAMILY MEDICINE | Facility: CLINIC | Age: 70
End: 2020-12-02
Payer: COMMERCIAL

## 2020-12-02 VITALS
TEMPERATURE: 98.4 F | HEART RATE: 79 BPM | BODY MASS INDEX: 38.61 KG/M2 | OXYGEN SATURATION: 95 % | SYSTOLIC BLOOD PRESSURE: 127 MMHG | WEIGHT: 246 LBS | DIASTOLIC BLOOD PRESSURE: 74 MMHG | HEIGHT: 67 IN

## 2020-12-02 DIAGNOSIS — E04.1 THYROID NODULE: Chronic | ICD-10-CM

## 2020-12-02 DIAGNOSIS — I10 ESSENTIAL HYPERTENSION WITH GOAL BLOOD PRESSURE LESS THAN 140/90: Primary | Chronic | ICD-10-CM

## 2020-12-02 DIAGNOSIS — Z87.891 FORMER SMOKER: ICD-10-CM

## 2020-12-02 DIAGNOSIS — E66.01 MORBID OBESITY (H): Chronic | ICD-10-CM

## 2020-12-02 DIAGNOSIS — I82.409 RECURRENT DEEP VEIN THROMBOSIS (DVT) (H): ICD-10-CM

## 2020-12-02 DIAGNOSIS — D68.51 FACTOR 5 LEIDEN MUTATION, HETEROZYGOUS (H): ICD-10-CM

## 2020-12-02 PROBLEM — M79.662 PAIN OF LEFT LOWER LEG: Status: RESOLVED | Noted: 2019-07-28 | Resolved: 2020-12-02

## 2020-12-02 PROBLEM — E66.811 CLASS 1 OBESITY WITHOUT SERIOUS COMORBIDITY WITH BODY MASS INDEX (BMI) OF 34.0 TO 34.9 IN ADULT: Chronic | Status: RESOLVED | Noted: 2017-12-17 | Resolved: 2020-12-02

## 2020-12-02 PROCEDURE — 80048 BASIC METABOLIC PNL TOTAL CA: CPT | Performed by: INTERNAL MEDICINE

## 2020-12-02 PROCEDURE — 99213 OFFICE O/P EST LOW 20 MIN: CPT | Performed by: INTERNAL MEDICINE

## 2020-12-02 PROCEDURE — 36415 COLL VENOUS BLD VENIPUNCTURE: CPT | Performed by: INTERNAL MEDICINE

## 2020-12-02 RX ORDER — LISINOPRIL 20 MG/1
20 TABLET ORAL DAILY
Qty: 90 TABLET | Refills: 3 | Status: SHIPPED | OUTPATIENT
Start: 2020-12-02 | End: 2021-12-17

## 2020-12-02 ASSESSMENT — MIFFLIN-ST. JEOR: SCORE: 1673.48

## 2020-12-02 NOTE — PROGRESS NOTES
"Subjective     Ariadna Duran is a 70 year old female who presents to clinic today for the following health issues:    HPI         Former PCP: Rakesh  Specialists: endo (on julien)-every 1-2 years, heme (factor 5)-no longer seeing  Problem list and medications reviewed and updated. See below for additional notes.  Social: former smoker; no etoh  HM: declines flu, discussed shingrix    Very pleasant 70 year old female here to establish care.    Hx blood clots: two separate episodes in the past in legs. Saw heme and placed on xarelto lifelong. Tolerates well. Denies bleeding episodes.    HTN: was increased from 10mg to 15mg to 20mg. Tolerating dose. BP better. Denies cp/sob/ha.     Follows endo for thyroid nodule, last in 2018. Stable.     Review of Systems   Constitutional, HEENT, cardiovascular, pulmonary, gi and gu systems are negative, except as otherwise noted.      Objective    /74 (BP Location: Right arm, Cuff Size: Adult Large)   Pulse 79   Temp 98.4  F (36.9  C) (Temporal)   Ht 1.702 m (5' 7\")   Wt 111.6 kg (246 lb)   LMP  (LMP Unknown)   SpO2 95%   Breastfeeding No   BMI 38.53 kg/m    Body mass index is 38.53 kg/m .  Physical Exam   . GENERAL APPEARANCE: AAOx3, no distress. Well developed.    RESP: Lungs CTA bilaterally. No w/r/r. No distress     CV: RRR, S1/S2 present. No m/r/c.       PSYCH: appropriate mood and affect.           Assessment & Plan     Ariadna was seen today for establish care.    Diagnoses and all orders for this visit:    Essential hypertension with goal blood pressure less than 140/90  Well controlled  -     Basic metabolic panel  -     lisinopril (ZESTRIL) 20 MG tablet; Take 1 tablet (20 mg) by mouth daily    Thyroid nodule   Following endo q1-2 years   2018 US stable nodule.     Recurrent deep vein thrombosis (DVT) (H)  Factor 5 Leiden mutation, heterozygous (H)  Discussed fall precautions, discussed risks/benefits of medication. Continue current management.  -     " rivaroxaban ANTICOAGULANT (XARELTO ANTICOAGULANT) 20 MG TABS tablet; Take 1 tablet (20 mg) by mouth daily (with dinner) Please schedule follow up visit    Obesity (BMI 35.0-39.9) with comorbidity (H)   Discussed improving cardiovascular health with increasing aerobic exercise, ideally 150 min/week. Taper up to goal as tolerated.          Return in about 6 months (around 6/2/2021) for Routine preventive, with me, in person, sooner if symptoms worsen or do not improve.    Karly Lynn DO  [facility]

## 2020-12-03 LAB
ANION GAP SERPL CALCULATED.3IONS-SCNC: 7 MMOL/L (ref 3–14)
BUN SERPL-MCNC: 16 MG/DL (ref 7–30)
CALCIUM SERPL-MCNC: 8.8 MG/DL (ref 8.5–10.1)
CHLORIDE SERPL-SCNC: 111 MMOL/L (ref 94–109)
CO2 SERPL-SCNC: 22 MMOL/L (ref 20–32)
CREAT SERPL-MCNC: 0.98 MG/DL (ref 0.52–1.04)
GFR SERPL CREATININE-BSD FRML MDRD: 59 ML/MIN/{1.73_M2}
GLUCOSE SERPL-MCNC: 93 MG/DL (ref 70–99)
POTASSIUM SERPL-SCNC: 4.3 MMOL/L (ref 3.4–5.3)
SODIUM SERPL-SCNC: 140 MMOL/L (ref 133–144)

## 2020-12-04 PROBLEM — N28.9 RENAL INSUFFICIENCY: Chronic | Status: ACTIVE | Noted: 2020-12-04

## 2020-12-04 PROBLEM — N28.9 RENAL INSUFFICIENCY: Status: ACTIVE | Noted: 2020-12-04

## 2021-01-12 ENCOUNTER — OFFICE VISIT (OUTPATIENT)
Dept: FAMILY MEDICINE | Facility: CLINIC | Age: 71
End: 2021-01-12
Payer: COMMERCIAL

## 2021-01-12 VITALS
DIASTOLIC BLOOD PRESSURE: 78 MMHG | OXYGEN SATURATION: 94 % | TEMPERATURE: 96.8 F | SYSTOLIC BLOOD PRESSURE: 130 MMHG | HEART RATE: 82 BPM | HEIGHT: 67 IN | BODY MASS INDEX: 38.3 KG/M2 | WEIGHT: 244 LBS

## 2021-01-12 DIAGNOSIS — I10 ESSENTIAL HYPERTENSION WITH GOAL BLOOD PRESSURE LESS THAN 140/90: Chronic | ICD-10-CM

## 2021-01-12 DIAGNOSIS — N28.9 RENAL INSUFFICIENCY: Primary | Chronic | ICD-10-CM

## 2021-01-12 LAB
ALBUMIN UR-MCNC: NEGATIVE MG/DL
APPEARANCE UR: CLEAR
BILIRUB UR QL STRIP: NEGATIVE
COLOR UR AUTO: YELLOW
GLUCOSE UR STRIP-MCNC: NEGATIVE MG/DL
HGB UR QL STRIP: NEGATIVE
KETONES UR STRIP-MCNC: 40 MG/DL
LEUKOCYTE ESTERASE UR QL STRIP: NEGATIVE
NITRATE UR QL: NEGATIVE
PH UR STRIP: 5.5 PH (ref 5–7)
SOURCE: ABNORMAL
SP GR UR STRIP: 1.02 (ref 1–1.03)
UROBILINOGEN UR STRIP-ACNC: 0.2 EU/DL (ref 0.2–1)

## 2021-01-12 PROCEDURE — 80048 BASIC METABOLIC PNL TOTAL CA: CPT | Performed by: INTERNAL MEDICINE

## 2021-01-12 PROCEDURE — 81003 URINALYSIS AUTO W/O SCOPE: CPT | Performed by: INTERNAL MEDICINE

## 2021-01-12 PROCEDURE — 99213 OFFICE O/P EST LOW 20 MIN: CPT | Performed by: INTERNAL MEDICINE

## 2021-01-12 PROCEDURE — 36415 COLL VENOUS BLD VENIPUNCTURE: CPT | Performed by: INTERNAL MEDICINE

## 2021-01-12 ASSESSMENT — MIFFLIN-ST. JEOR: SCORE: 1664.41

## 2021-01-12 NOTE — PROGRESS NOTES
"  Assessment & Plan     Renal insufficiency  Reviewed med hx and likely etiology is ACEi (dosage increased recently by previous PCP).  Discussed other possible causes as well including but not limited to lack of hydration vs pathologic causes.  Recommend recheck BMP, check urine.  If stable, monitor again in 3 months at physical  If worsening, consider med adjustments.  Patient agreeable with plan    - Basic metabolic panel  - UA reflex to Microscopic and Culture    Essential hypertension with goal blood pressure less than 140/90  Controlled.    Return in about 3 months (around 4/12/2021) for Routine preventive, with me, in person, sooner if symptoms worsen or do not improve.    Karly Lynn DO  [facility] [place] is a 70 year old who presents to clinic today for the following health issues     HPI       Chief Complaint   Patient presents with     Results       Patient here for f/u on kidney function. Historically normal. Dose change not too long ago from 10mg to 15mg to 20mg ACEi. She was previously on hydrochlorothiazide as well but this was discontinued per her request with former pcp and continued on ACEi (dose titrated). Denies dysuria/abd pain/frequency. She thinks might have had history of kidney stone but no current symptoms.    Review of Systems   Constitutional, HEENT, cardiovascular, pulmonary, gi and gu systems are negative, except as otherwise noted.      Objective    /78 (BP Location: Right arm, Patient Position: Sitting, Cuff Size: Adult Regular)   Pulse 82   Temp 96.8  F (36  C) (Temporal)   Ht 1.702 m (5' 7\")   Wt 110.7 kg (244 lb)   LMP  (LMP Unknown)   SpO2 94%   Breastfeeding No   BMI 38.22 kg/m    Body mass index is 38.22 kg/m .  Physical Exam   .  GENERAL APPEARANCE: AAOx3, no distress. Well developed.      PSYCH: appropriate mood and affect.           "

## 2021-01-13 PROBLEM — N28.9 RENAL INSUFFICIENCY: Chronic | Status: RESOLVED | Noted: 2020-12-04 | Resolved: 2021-01-13

## 2021-01-13 LAB
ANION GAP SERPL CALCULATED.3IONS-SCNC: 6 MMOL/L (ref 3–14)
BUN SERPL-MCNC: 17 MG/DL (ref 7–30)
CALCIUM SERPL-MCNC: 8.7 MG/DL (ref 8.5–10.1)
CHLORIDE SERPL-SCNC: 112 MMOL/L (ref 94–109)
CO2 SERPL-SCNC: 23 MMOL/L (ref 20–32)
CREAT SERPL-MCNC: 0.7 MG/DL (ref 0.52–1.04)
GFR SERPL CREATININE-BSD FRML MDRD: 88 ML/MIN/{1.73_M2}
GLUCOSE SERPL-MCNC: 98 MG/DL (ref 70–99)
POTASSIUM SERPL-SCNC: 4.3 MMOL/L (ref 3.4–5.3)
SODIUM SERPL-SCNC: 141 MMOL/L (ref 133–144)

## 2021-03-28 ENCOUNTER — HEALTH MAINTENANCE LETTER (OUTPATIENT)
Age: 71
End: 2021-03-28

## 2021-05-24 ENCOUNTER — RECORDS - HEALTHEAST (OUTPATIENT)
Dept: ADMINISTRATIVE | Facility: CLINIC | Age: 71
End: 2021-05-24

## 2021-05-25 ENCOUNTER — RECORDS - HEALTHEAST (OUTPATIENT)
Dept: ADMINISTRATIVE | Facility: CLINIC | Age: 71
End: 2021-05-25

## 2021-07-13 ENCOUNTER — RECORDS - HEALTHEAST (OUTPATIENT)
Dept: ADMINISTRATIVE | Facility: CLINIC | Age: 71
End: 2021-07-13

## 2021-07-21 ENCOUNTER — RECORDS - HEALTHEAST (OUTPATIENT)
Dept: ADMINISTRATIVE | Facility: CLINIC | Age: 71
End: 2021-07-21

## 2021-08-16 ENCOUNTER — OFFICE VISIT (OUTPATIENT)
Dept: FAMILY MEDICINE | Facility: CLINIC | Age: 71
End: 2021-08-16
Payer: COMMERCIAL

## 2021-08-16 VITALS
SYSTOLIC BLOOD PRESSURE: 123 MMHG | HEIGHT: 67 IN | DIASTOLIC BLOOD PRESSURE: 80 MMHG | OXYGEN SATURATION: 99 % | TEMPERATURE: 97.3 F | HEART RATE: 71 BPM | BODY MASS INDEX: 40.18 KG/M2 | WEIGHT: 256 LBS

## 2021-08-16 DIAGNOSIS — I10 ESSENTIAL HYPERTENSION WITH GOAL BLOOD PRESSURE LESS THAN 140/90: ICD-10-CM

## 2021-08-16 DIAGNOSIS — Z78.0 ASYMPTOMATIC POSTMENOPAUSAL STATUS: ICD-10-CM

## 2021-08-16 DIAGNOSIS — R73.01 IMPAIRED FASTING GLUCOSE: ICD-10-CM

## 2021-08-16 DIAGNOSIS — Z13.820 SCREENING FOR OSTEOPOROSIS: ICD-10-CM

## 2021-08-16 DIAGNOSIS — Z00.00 ROUTINE HISTORY AND PHYSICAL EXAMINATION OF ADULT: Primary | ICD-10-CM

## 2021-08-16 DIAGNOSIS — Z23 NEED FOR VACCINATION: ICD-10-CM

## 2021-08-16 DIAGNOSIS — Z12.31 ENCOUNTER FOR SCREENING MAMMOGRAM FOR BREAST CANCER: ICD-10-CM

## 2021-08-16 DIAGNOSIS — E04.1 THYROID NODULE: Chronic | ICD-10-CM

## 2021-08-16 DIAGNOSIS — D68.51 FACTOR 5 LEIDEN MUTATION, HETEROZYGOUS (H): Chronic | ICD-10-CM

## 2021-08-16 DIAGNOSIS — I82.409 RECURRENT DEEP VEIN THROMBOSIS (DVT) (H): Chronic | ICD-10-CM

## 2021-08-16 LAB
BASOPHILS # BLD AUTO: 0 10E3/UL (ref 0–0.2)
BASOPHILS NFR BLD AUTO: 0 %
EOSINOPHIL # BLD AUTO: 0.2 10E3/UL (ref 0–0.7)
EOSINOPHIL NFR BLD AUTO: 4 %
ERYTHROCYTE [DISTWIDTH] IN BLOOD BY AUTOMATED COUNT: 15.5 % (ref 10–15)
HCT VFR BLD AUTO: 39 % (ref 35–47)
HGB BLD-MCNC: 12.4 G/DL (ref 11.7–15.7)
LYMPHOCYTES # BLD AUTO: 1.7 10E3/UL (ref 0.8–5.3)
LYMPHOCYTES NFR BLD AUTO: 35 %
MCH RBC QN AUTO: 30.6 PG (ref 26.5–33)
MCHC RBC AUTO-ENTMCNC: 31.8 G/DL (ref 31.5–36.5)
MCV RBC AUTO: 96 FL (ref 78–100)
MONOCYTES # BLD AUTO: 0.4 10E3/UL (ref 0–1.3)
MONOCYTES NFR BLD AUTO: 8 %
NEUTROPHILS # BLD AUTO: 2.5 10E3/UL (ref 1.6–8.3)
NEUTROPHILS NFR BLD AUTO: 53 %
PLATELET # BLD AUTO: 230 10E3/UL (ref 150–450)
RBC # BLD AUTO: 4.05 10E6/UL (ref 3.8–5.2)
WBC # BLD AUTO: 4.8 10E3/UL (ref 4–11)

## 2021-08-16 PROCEDURE — 80053 COMPREHEN METABOLIC PANEL: CPT | Performed by: INTERNAL MEDICINE

## 2021-08-16 PROCEDURE — G0009 ADMIN PNEUMOCOCCAL VACCINE: HCPCS | Performed by: INTERNAL MEDICINE

## 2021-08-16 PROCEDURE — 36415 COLL VENOUS BLD VENIPUNCTURE: CPT | Performed by: INTERNAL MEDICINE

## 2021-08-16 PROCEDURE — 80061 LIPID PANEL: CPT | Performed by: INTERNAL MEDICINE

## 2021-08-16 PROCEDURE — 85025 COMPLETE CBC W/AUTO DIFF WBC: CPT | Performed by: INTERNAL MEDICINE

## 2021-08-16 PROCEDURE — 83036 HEMOGLOBIN GLYCOSYLATED A1C: CPT | Performed by: INTERNAL MEDICINE

## 2021-08-16 PROCEDURE — 99397 PER PM REEVAL EST PAT 65+ YR: CPT | Mod: 25 | Performed by: INTERNAL MEDICINE

## 2021-08-16 PROCEDURE — 90732 PPSV23 VACC 2 YRS+ SUBQ/IM: CPT | Performed by: INTERNAL MEDICINE

## 2021-08-16 ASSESSMENT — ENCOUNTER SYMPTOMS
CONSTIPATION: 0
MYALGIAS: 0
PALPITATIONS: 0
JOINT SWELLING: 0
DIARRHEA: 0
EYE PAIN: 0
PARESTHESIAS: 0
SORE THROAT: 0
HEADACHES: 0
SHORTNESS OF BREATH: 0
ABDOMINAL PAIN: 0
HEARTBURN: 0
NAUSEA: 0
DIZZINESS: 0
CHILLS: 0
BREAST MASS: 0
NERVOUS/ANXIOUS: 0
COUGH: 0
ARTHRALGIAS: 0
DYSURIA: 0
WEAKNESS: 0
FREQUENCY: 0
HEMATURIA: 0
FEVER: 0
HEMATOCHEZIA: 0

## 2021-08-16 ASSESSMENT — ACTIVITIES OF DAILY LIVING (ADL): CURRENT_FUNCTION: NO ASSISTANCE NEEDED

## 2021-08-16 ASSESSMENT — MIFFLIN-ST. JEOR: SCORE: 1718.84

## 2021-08-16 NOTE — PROGRESS NOTES
Prior to immunization administration, verified patients identity using patient s name and date of birth. Please see Immunization Activity for additional information.     Screening Questionnaire for Adult Immunization    Are you sick today?   No   Do you have allergies to medications, food, a vaccine component or latex?   No   Have you ever had a serious reaction after receiving a vaccination?   No   Do you have a long-term health problem with heart, lung, kidney, or metabolic disease (e.g., diabetes), asthma, a blood disorder, no spleen, complement component deficiency, a cochlear implant, or a spinal fluid leak?  Are you on long-term aspirin therapy?   No   Do you have cancer, leukemia, HIV/AIDS, or any other immune system problem?   No   Do you have a parent, brother, or sister with an immune system problem?   No   In the past 3 months, have you taken medications that affect  your immune system, such as prednisone, other steroids, or anticancer drugs; drugs for the treatment of rheumatoid arthritis, Crohn s disease, or psoriasis; or have you had radiation treatments?   No   Have you had a seizure, or a brain or other nervous system problem?   No   During the past year, have you received a transfusion of blood or blood    products, or been given immune (gamma) globulin or antiviral drug?   No   For women: Are you pregnant or is there a chance you could become       pregnant during the next month?   No   Have you received any vaccinations in the past 4 weeks?   No     Immunization questionnaire answers were all negative.        Per orders of Dr. Lynn, injection of PPSV23 given by Linda Mccoy CMA. Patient instructed to remain in clinic for 15 minutes afterwards, and to report any adverse reaction to me immediately.       Screening performed by Linda Mccoy CMA on 8/16/2021 at 1:30 PM.

## 2021-08-16 NOTE — PATIENT INSTRUCTIONS
Shingrix  is:  1) Recommended for healthy adults aged 50 years and older to help prevent shingles and its related complications such as pain related to the shingles virus  2) Recommended for adults who previously received the previous shingles vaccine (Zostavax )  3) The preferred vaccine for helping to prevent shingles and its related complications  4) It is a series of TWO vaccines with the second dose administered between 2-6 months after the first dose in this series  5) PLEASE CHECK COST WITH YOUR INSURANCE COMPANY OR YOUR LOCAL PHARMACY AS EACH DOSE MAY BE AS MUCH AS APPROXIMATELY $300 IF NOT COVERED BY INSURANCE

## 2021-08-16 NOTE — PROGRESS NOTES
"SUBJECTIVE:   Ariadna Duran is a 70 year old female who presents for Preventive Visit.    Patient has been advised of split billing requirements and indicates understanding: Yes   Are you in the first 12 months of your Medicare coverage?  No    Healthy Habits:     In general, how would you rate your overall health?  Good    Frequency of exercise:  None    Do you usually eat at least 4 servings of fruit and vegetables a day, include whole grains    & fiber and avoid regularly eating high fat or \"junk\" foods?  Yes    Taking medications regularly:  Yes    Barriers to taking medications:  None    Medication side effects:  None    Ability to successfully perform activities of daily living:  No assistance needed    Home Safety:  No safety concerns identified    Hearing Impairment:  Difficulty following a conversation in a noisy restaurant or crowded room, feel that people are mumbling or not speaking clearly, difficulty following dialogue in the theater, difficult to understand a speaker at a public meeting or Jainism service and need to ask people to speak up or repeat themselves    In the past 6 months, have you been bothered by leaking of urine?  No    In general, how would you rate your overall mental or emotional health?  Good      PHQ-2 Total Score: 0    Additional concerns today:  No    Do you feel safe in your environment? Yes    Have you ever done Advance Care Planning? (For example, a Health Directive, POLST, or a discussion with a medical provider or your loved ones about your wishes): No, advance care planning information given to patient to review.  Patient plans to discuss their wishes with loved ones or provider.         Fall risk  Fallen 2 or more times in the past year?: No  Any fall with injury in the past year?: No  Do you have sleep apnea, excessive snoring or daytime drowsiness?: no    Reviewed and updated as needed this visit by clinical staff  Tobacco  Allergies  Meds              Reviewed " and updated as needed this visit by Provider                Social History     Tobacco Use     Smoking status: Former Smoker     Packs/day: 0.50     Years: 3.00     Pack years: 1.50     Types: Cigarettes     Quit date: 1972     Years since quittin.6     Smokeless tobacco: Never Used     Tobacco comment:  Pt quit smoking  (5 years)   Substance Use Topics     Alcohol use: Yes     Alcohol/week: 0.0 standard drinks     Comment:  WINE 1-2 X PER WEEK     If you drink alcohol do you typically have >3 drinks per day or >7 drinks per week? No    Alcohol Use 2021   Prescreen: >3 drinks/day or >7 drinks/week? No   Prescreen: >3 drinks/day or >7 drinks/week? -       Current providers sharing in care for this patient include:   Patient Care Team:  Karly Lynn DO as PCP - General (Internal Medicine)  Martha Balbuena MD as MD (INTERNAL MEDICINE - ENDOCRINOLOGY, DIABETES & METABOLISM)  Karly Lynn DO as Assigned PCP    The following health maintenance items are reviewed in Epic and correct as of today:  Health Maintenance Due   Topic Date Due     DEXA  Never done     COVID-19 Vaccine (1) Never done     ZOSTER IMMUNIZATION (1 of 2) Never done     MAMMO SCREENING  01/15/2020         Breast CA Risk Assessment (FHS-7) 2021   Did any of your first-degree relatives have breast or ovarian cancer? Yes   Did any of your relatives have bilateral breast cancer? No   Did any man in your family have breast cancer? No   Did any woman in your family have breast and ovarian cancer? Yes   Did any woman in your family have breast cancer before age 50 y? No   Do you have 2 or more relatives with breast and/or ovarian cancer? No   Do you have 2 or more relatives with breast and/or bowel cancer? No       Exercise/Diet: as per above    Pap: N/I  Mammo: due  DXA (vit D): due  Colonoscopy: utd    COVID; declines, education provided  Pna23: due, agreeable  Pna13: ezequiel  Shingrix: discussed, she will check with  "insurance          Review of Systems   Constitutional: Negative for chills and fever.   HENT: Negative for congestion, ear pain, hearing loss and sore throat.    Eyes: Negative for pain and visual disturbance.   Respiratory: Negative for cough and shortness of breath.    Cardiovascular: Negative for chest pain, palpitations and peripheral edema.   Gastrointestinal: Negative for abdominal pain, constipation, diarrhea, heartburn, hematochezia and nausea.   Breasts:  Negative for tenderness, breast mass and discharge.   Genitourinary: Negative for dysuria, frequency, genital sores, hematuria, pelvic pain, urgency, vaginal bleeding and vaginal discharge.   Musculoskeletal: Negative for arthralgias, joint swelling and myalgias.   Skin: Negative for rash.   Neurological: Negative for dizziness, weakness, headaches and paresthesias.   Psychiatric/Behavioral: Negative for mood changes. The patient is not nervous/anxious.        OBJECTIVE:   /80 (BP Location: Right arm, Cuff Size: Adult Large)   Pulse 71   Temp 97.3  F (36.3  C) (Temporal)   Ht 1.702 m (5' 7\")   Wt 116.1 kg (256 lb)   LMP  (LMP Unknown)   SpO2 99%   Breastfeeding No   BMI 40.10 kg/m   Estimated body mass index is 40.1 kg/m  as calculated from the following:    Height as of this encounter: 1.702 m (5' 7\").    Weight as of this encounter: 116.1 kg (256 lb).  Physical Exam    GENERAL APPEARANCE: AAOx3, no distress. Well developed.    RESP: Lungs CTA bilaterally. No w/r/r. No distress     CV: RRR, S1/S2 present. No m/r/c.     ABDOMEN:  soft, nontender, no distention. No rebound or guarding.     EXT: No c/c/e in lower extremities b/l. No rashes or deformities noted.    MSK: ROM and strength intact in four extremities. No gross deformities noted.    SKIN: no suspicious lesions or rashes    NEURO: AAOx3, Motor function intact w/ 5/5 strength bilaterally to UE and LE.  Speech is fluent and comprehension intact. No gait abnormalities noted.    PSYCH: " "appropriate mood and affect.   .    ASSESSMENT / PLAN:   Ariadna was seen today for physical.    Diagnoses and all orders for this visit:    Routine history and physical examination of adult   Pneumovax 23 given today   Declines COVID vaccine-education provided   Discussed shingrix-she will look into this    Essential hypertension with goal blood pressure less than 140/90  controlled  -     Comprehensive metabolic panel; Future  -     CBC with Platelets & Differential; Future  -     Lipid panel reflex to direct LDL Fasting; Future    Recurrent deep vein thrombosis (DVT) (H)  Factor 5 Leiden mutation, heterozygous (H)   On anticoagulation, evaluated by heme in the past    Thyroid nodule   Follows endo     Encounter for screening mammogram for breast cancer  Overdue, discussed, recommend yearly mammo  -     MA Screen Bilateral w/Balta; Future    Screening for osteoporosis  -     DX Hip/Pelvis/Spine; Future    Asymptomatic postmenopausal status  -     DX Hip/Pelvis/Spine; Future    Need for vaccination  -     Pneumococcal vaccine 23 valent PPSV23  (Pneumovax) [[id number]]  -     ADMIN MEDICARE: Pneumococcal Vaccine ()    Other orders  -     REVIEW OF HEALTH MAINTENANCE PROTOCOL ORDERS        COUNSELING:  Reviewed preventive health counseling, as reflected in patient instructions       Regular exercise       Healthy diet/nutrition    Estimated body mass index is 40.1 kg/m  as calculated from the following:    Height as of this encounter: 1.702 m (5' 7\").    Weight as of this encounter: 116.1 kg (256 lb).        She reports that she quit smoking about 49 years ago. Her smoking use included cigarettes. She has a 1.50 pack-year smoking history. She has never used smokeless tobacco.      Appropriate preventive services were discussed with this patient, including applicable screening as appropriate for cardiovascular disease, diabetes, osteopenia/osteoporosis, and glaucoma.  As appropriate for age/gender, discussed screening for " colorectal cancer, prostate cancer, breast cancer, and cervical cancer. Checklist reviewing preventive services available has been given to the patient.    Reviewed patients plan of care and provided an AVS. The Basic Care Plan (routine screening as documented in Health Maintenance) for Ariadna meets the Care Plan requirement. This Care Plan has been established and reviewed with the Patient.    Counseling Resources:  ATP IV Guidelines  Pooled Cohorts Equation Calculator  Breast Cancer Risk Calculator  Breast Cancer: Medication to Reduce Risk  FRAX Risk Assessment  ICSI Preventive Guidelines  Dietary Guidelines for Americans, 2010  USDA's MyPlate  ASA Prophylaxis  Lung CA Screening    DO LUANEN [facility]

## 2021-08-17 LAB
ALBUMIN SERPL-MCNC: 3.6 G/DL (ref 3.4–5)
ALP SERPL-CCNC: 90 U/L (ref 40–150)
ALT SERPL W P-5'-P-CCNC: 26 U/L (ref 0–50)
ANION GAP SERPL CALCULATED.3IONS-SCNC: 3 MMOL/L (ref 3–14)
AST SERPL W P-5'-P-CCNC: 21 U/L (ref 0–45)
BILIRUB SERPL-MCNC: 0.3 MG/DL (ref 0.2–1.3)
BUN SERPL-MCNC: 14 MG/DL (ref 7–30)
CALCIUM SERPL-MCNC: 8.9 MG/DL (ref 8.5–10.1)
CHLORIDE BLD-SCNC: 112 MMOL/L (ref 94–109)
CHOLEST SERPL-MCNC: 167 MG/DL
CO2 SERPL-SCNC: 25 MMOL/L (ref 20–32)
CREAT SERPL-MCNC: 0.79 MG/DL (ref 0.52–1.04)
FASTING STATUS PATIENT QL REPORTED: NO
GFR SERPL CREATININE-BSD FRML MDRD: 77 ML/MIN/1.73M2
GLUCOSE BLD-MCNC: 98 MG/DL (ref 70–99)
HBA1C MFR BLD: 5 % (ref 0–5.6)
HDLC SERPL-MCNC: 48 MG/DL
LDLC SERPL CALC-MCNC: 75 MG/DL
NONHDLC SERPL-MCNC: 119 MG/DL
POTASSIUM BLD-SCNC: 4.2 MMOL/L (ref 3.4–5.3)
PROT SERPL-MCNC: 6.4 G/DL (ref 6.8–8.8)
SODIUM SERPL-SCNC: 140 MMOL/L (ref 133–144)
TRIGL SERPL-MCNC: 219 MG/DL

## 2021-09-11 ENCOUNTER — HEALTH MAINTENANCE LETTER (OUTPATIENT)
Age: 71
End: 2021-09-11

## 2021-12-16 DIAGNOSIS — I10 ESSENTIAL HYPERTENSION WITH GOAL BLOOD PRESSURE LESS THAN 140/90: Chronic | ICD-10-CM

## 2021-12-16 DIAGNOSIS — I82.409 RECURRENT DEEP VEIN THROMBOSIS (DVT) (H): ICD-10-CM

## 2021-12-17 RX ORDER — LISINOPRIL 20 MG/1
20 TABLET ORAL DAILY
Qty: 90 TABLET | Refills: 1 | Status: SHIPPED | OUTPATIENT
Start: 2021-12-17 | End: 2022-06-17

## 2021-12-17 NOTE — TELEPHONE ENCOUNTER
Last OV 8/16/21, follow up 1 year     Xarelto - Routing refill request to provider for review/approval because:  Fails protocol due to creatinine required every 3 months per protocol

## 2022-06-16 DIAGNOSIS — I10 ESSENTIAL HYPERTENSION WITH GOAL BLOOD PRESSURE LESS THAN 140/90: Chronic | ICD-10-CM

## 2022-06-17 RX ORDER — LISINOPRIL 20 MG/1
20 TABLET ORAL DAILY
Qty: 90 TABLET | Refills: 0 | Status: SHIPPED | OUTPATIENT
Start: 2022-06-17 | End: 2022-09-14

## 2022-06-17 NOTE — TELEPHONE ENCOUNTER
Medication filled 1 time as pt is due for a follow-up in clinic. Pharmacy has been notified to inform patient to call clinic and schedule appointment.

## 2022-09-13 DIAGNOSIS — I10 ESSENTIAL HYPERTENSION WITH GOAL BLOOD PRESSURE LESS THAN 140/90: Chronic | ICD-10-CM

## 2022-09-14 ENCOUNTER — MYC MEDICAL ADVICE (OUTPATIENT)
Dept: FAMILY MEDICINE | Facility: CLINIC | Age: 72
End: 2022-09-14

## 2022-09-14 RX ORDER — LISINOPRIL 20 MG/1
TABLET ORAL
Qty: 90 TABLET | Refills: 0 | Status: SHIPPED | OUTPATIENT
Start: 2022-09-14 | End: 2022-10-24

## 2022-09-14 NOTE — TELEPHONE ENCOUNTER
Routing refill request to provider for review/approval because:      LOV: 8/16/21 - no future OV scheduled - sent patient ViajaNett message to schedule future visit     Celestine Marr RN  [facility]

## 2022-10-24 ENCOUNTER — VIRTUAL VISIT (OUTPATIENT)
Dept: FAMILY MEDICINE | Facility: CLINIC | Age: 72
End: 2022-10-24
Payer: COMMERCIAL

## 2022-10-24 DIAGNOSIS — I10 ESSENTIAL HYPERTENSION WITH GOAL BLOOD PRESSURE LESS THAN 140/90: Primary | Chronic | ICD-10-CM

## 2022-10-24 DIAGNOSIS — I82.409 RECURRENT DEEP VEIN THROMBOSIS (DVT) (H): Chronic | ICD-10-CM

## 2022-10-24 DIAGNOSIS — D68.51 FACTOR 5 LEIDEN MUTATION, HETEROZYGOUS (H): Chronic | ICD-10-CM

## 2022-10-24 PROCEDURE — 99213 OFFICE O/P EST LOW 20 MIN: CPT | Mod: 95 | Performed by: INTERNAL MEDICINE

## 2022-10-24 RX ORDER — LISINOPRIL 20 MG/1
20 TABLET ORAL DAILY
Qty: 90 TABLET | Refills: 1 | Status: SHIPPED | OUTPATIENT
Start: 2022-10-24 | End: 2023-01-03

## 2022-10-24 NOTE — PROGRESS NOTES
Ariadna is a 72 year old who is being evaluated via a billable video visit.      How would you like to obtain your AVS? MyChart  If the video visit is dropped, the invitation should be resent by: Text to cell phone: [phone]  Will anyone else be joining your video visit? No      Assessment & Plan     Essential hypertension with goal blood pressure less than 140/90  Continue rx:  - lisinopril (ZESTRIL) 20 MG tablet  Dispense: 90 tablet; Refill: 1    Factor 5 Leiden mutation, heterozygous (H)  Recurrent deep vein thrombosis (DVT) (H)  Continue rx:  - rivaroxaban ANTICOAGULANT (XARELTO ANTICOAGULANT) 20 MG TABS tablet  Dispense: 90 tablet; Refill: 1    She was encouraged to schedule in office preventative visit with me within 3-6 months for labs and ongoing refills.    Return in about 3 months (around 1/24/2023) for Routine preventive, with me, in person, sooner if symptoms worsen or do not improve.    Karly Lynn [facility]   Ariadna is a 72 year old, presenting for the following health issues:  Recheck Medication      HPI     Ariadna presents virtually for med refills. She admits she is overdue for care as her  passed away since our last visit. She is leaning on family and community support. She will need refill of her meds. She is compliant, no SE. States overdue to see her endo and will plan to schedule with them too.    Review of Systems   As per hpi        Objective    Vitals - Patient Reported  Systolic (Patient Reported): 132  Diastolic (Patient Reported): 80      Vitals:  No vitals were obtained today due to virtual visit.    Physical Exam   GEN: No acute distress  RESP: No audible increased work of breathing. Patient speaking in full sentences without distress.  PSYCH: pleasant  Exam otherwise limited due to virtual platform          Video-Visit Details    Video Start Time: 10:32 AM    Type of service:  Video Visit    Video End Time:10:42 AM    Originating Location  (pt. Location): Home    Distant Location (provider location):  On-site    Platform used for Video Visit: Lianne

## 2022-10-30 ENCOUNTER — HEALTH MAINTENANCE LETTER (OUTPATIENT)
Age: 72
End: 2022-10-30

## 2022-12-17 ENCOUNTER — HEALTH MAINTENANCE LETTER (OUTPATIENT)
Age: 72
End: 2022-12-17

## 2023-01-03 DIAGNOSIS — I10 ESSENTIAL HYPERTENSION WITH GOAL BLOOD PRESSURE LESS THAN 140/90: Chronic | ICD-10-CM

## 2023-01-03 RX ORDER — LISINOPRIL 20 MG/1
TABLET ORAL
Qty: 90 TABLET | Refills: 1 | Status: SHIPPED | OUTPATIENT
Start: 2023-01-03 | End: 2024-01-15

## 2023-07-06 DIAGNOSIS — I82.409 RECURRENT DEEP VEIN THROMBOSIS (DVT) (H): Chronic | ICD-10-CM

## 2023-07-06 DIAGNOSIS — D68.51 FACTOR 5 LEIDEN MUTATION, HETEROZYGOUS (H): Chronic | ICD-10-CM

## 2023-07-07 RX ORDER — RIVAROXABAN 20 MG/1
TABLET, FILM COATED ORAL
Qty: 90 TABLET | Refills: 1 | Status: SHIPPED | OUTPATIENT
Start: 2023-07-07 | End: 2024-01-15

## 2023-11-12 ENCOUNTER — HEALTH MAINTENANCE LETTER (OUTPATIENT)
Age: 73
End: 2023-11-12

## 2024-01-12 DIAGNOSIS — I10 ESSENTIAL HYPERTENSION WITH GOAL BLOOD PRESSURE LESS THAN 140/90: Chronic | ICD-10-CM

## 2024-01-12 RX ORDER — LISINOPRIL 20 MG/1
20 TABLET ORAL DAILY
Qty: 90 TABLET | Refills: 1 | Status: CANCELLED | OUTPATIENT
Start: 2024-01-12

## 2024-01-12 NOTE — TELEPHONE ENCOUNTER
Last OV 8/16/21, virtual visit 10/24/22. Former Lynn patient.      Appointments in Next Year      Brayan 15, 2024  2:30 PM  (Arrive by 2:25 PM)  Provider Visit with Bela Garza MD  [facility] ([facility] [place] ) [phone]

## 2024-01-15 ENCOUNTER — VIRTUAL VISIT (OUTPATIENT)
Dept: FAMILY MEDICINE | Facility: CLINIC | Age: 74
End: 2024-01-15
Payer: COMMERCIAL

## 2024-01-15 DIAGNOSIS — I10 ESSENTIAL HYPERTENSION WITH GOAL BLOOD PRESSURE LESS THAN 140/90: Chronic | ICD-10-CM

## 2024-01-15 DIAGNOSIS — E04.1 THYROID NODULE: Chronic | ICD-10-CM

## 2024-01-15 DIAGNOSIS — D68.51 FACTOR 5 LEIDEN MUTATION, HETEROZYGOUS (H): Primary | Chronic | ICD-10-CM

## 2024-01-15 DIAGNOSIS — I82.409 RECURRENT DEEP VEIN THROMBOSIS (DVT) (H): Chronic | ICD-10-CM

## 2024-01-15 PROBLEM — Z12.31 VISIT FOR SCREENING MAMMOGRAM: Status: ACTIVE | Noted: 2024-01-15

## 2024-01-15 PROCEDURE — 99214 OFFICE O/P EST MOD 30 MIN: CPT | Mod: 95 | Performed by: INTERNAL MEDICINE

## 2024-01-15 RX ORDER — LISINOPRIL 20 MG/1
20 TABLET ORAL DAILY
Qty: 90 TABLET | Refills: 3 | Status: SHIPPED | OUTPATIENT
Start: 2024-01-15

## 2024-01-15 NOTE — PROGRESS NOTES
Ariadna is a 73 year old who is being evaluated via a billable video visit.      How would you like to obtain your AVS? MyChart  If the video visit is dropped, the invitation should be resent by: Text to cell phone: [phone]  Will anyone else be joining your video visit? No      Assessment & Plan     Factor 5 Leiden mutation, heterozygous (H24)  - rivaroxaban ANTICOAGULANT (XARELTO ANTICOAGULANT) 20 MG TABS tablet; Take 1 tablet (20 mg) by mouth daily (with dinner)    Recurrent deep vein thrombosis (DVT) (H)  Stable. Continue medication.  - rivaroxaban ANTICOAGULANT (XARELTO ANTICOAGULANT) 20 MG TABS tablet; Take 1 tablet (20 mg) by mouth daily (with dinner)    Essential hypertension with goal blood pressure less than 140/90  Stable. Continue medication.  - lisinopril (ZESTRIL) 20 MG tablet; Take 1 tablet (20 mg) by mouth daily    Thyroid nodule  Repeat ultrasound  - US Thyroid; Future       See Patient Instructions    Bela Garza MD  [facility]   Ariadna is a 73 year old, presenting for the following health issues:  Medication Refill    Ariadna is here for medication check.    She will establish care with someone in [place]. She moved there recently after her husbands death. She has good family support. Declined talk therapy.     She has factor 5 leiden mutation, hx of DVT, thyroid nodule, HTN.      History of Present Illness       Reason for visit:  Medication refill    She eats 2-3 servings of fruits and vegetables daily.She consumes 0 sweetened beverage(s) daily.She exercises with enough effort to increase her heart rate 9 or less minutes per day.  She exercises with enough effort to increase her heart rate 5 days per week.   She is taking medications regularly.       Review of Systems       Objective       Vitals:  No vitals were obtained today due to virtual visit.    Physical Exam   GEN: No acute distress  RESP: No audible increased work of breathing. Patient speaking in full  sentences without distress.  PSYCH: pleasant  Exam otherwise limited due to virtual platform          Video-Visit Details    Type of service:  Video Visit   Video Start Time:  2: 40 pm  Video End Time: 2: 50 pm    Originating Location (pt. Location): Home  Distant Location (provider location):  On-site  Platform used for Video Visit: Lianne

## 2024-02-20 ENCOUNTER — HOSPITAL ENCOUNTER (OUTPATIENT)
Dept: ULTRASOUND IMAGING | Facility: HOSPITAL | Age: 74
Discharge: HOME | End: 2024-02-20
Attending: INTERNAL MEDICINE
Payer: COMMERCIAL

## 2024-02-20 ENCOUNTER — ANCILLARY PROCEDURE (OUTPATIENT)
Dept: MAMMOGRAPHY | Facility: HOSPITAL | Age: 74
End: 2024-02-20
Attending: INTERNAL MEDICINE
Payer: COMMERCIAL

## 2024-02-20 DIAGNOSIS — E04.1 THYROID NODULE: Chronic | ICD-10-CM

## 2024-02-20 DIAGNOSIS — Z12.31 ENCOUNTER FOR SCREENING MAMMOGRAM FOR BREAST CANCER: ICD-10-CM

## 2024-02-20 PROCEDURE — 77063 BREAST TOMOSYNTHESIS BI: CPT

## 2024-02-20 PROCEDURE — 76536 US EXAM OF HEAD AND NECK: CPT

## 2024-05-31 ENCOUNTER — HOSPITAL ENCOUNTER (OUTPATIENT)
Dept: BONE DENSITY | Facility: HOSPITAL | Age: 74
Discharge: HOME | End: 2024-05-31
Payer: COMMERCIAL

## 2024-05-31 DIAGNOSIS — E55.9 VITAMIN D DEFICIENCY: ICD-10-CM

## 2024-05-31 DIAGNOSIS — Z78.0 POSTMENOPAUSAL STATUS: ICD-10-CM

## 2024-05-31 DIAGNOSIS — E66.01 MORBID OBESITY (H): ICD-10-CM

## 2024-05-31 PROCEDURE — 77089 TBS DXA CAL W/I&R FX RISK: CPT

## 2024-10-07 DIAGNOSIS — D68.51 FACTOR 5 LEIDEN MUTATION, HETEROZYGOUS (H): Chronic | ICD-10-CM

## 2024-10-07 DIAGNOSIS — I10 ESSENTIAL HYPERTENSION WITH GOAL BLOOD PRESSURE LESS THAN 140/90: Chronic | ICD-10-CM

## 2024-10-07 DIAGNOSIS — I82.409 RECURRENT DEEP VEIN THROMBOSIS (DVT) (H): Chronic | ICD-10-CM

## 2024-10-07 RX ORDER — LISINOPRIL 20 MG/1
20 TABLET ORAL DAILY
Qty: 90 TABLET | Refills: 3 | OUTPATIENT
Start: 2024-10-07

## 2024-10-07 RX ORDER — RIVAROXABAN 20 MG/1
20 TABLET, FILM COATED ORAL
Qty: 90 TABLET | Refills: 3 | OUTPATIENT
Start: 2024-10-07

## 2024-12-28 ENCOUNTER — HEALTH MAINTENANCE LETTER (OUTPATIENT)
Age: 74
End: 2024-12-28